# Patient Record
Sex: FEMALE | Race: WHITE | NOT HISPANIC OR LATINO | Employment: OTHER | URBAN - NONMETROPOLITAN AREA
[De-identification: names, ages, dates, MRNs, and addresses within clinical notes are randomized per-mention and may not be internally consistent; named-entity substitution may affect disease eponyms.]

---

## 2018-06-01 ENCOUNTER — OFFICE VISIT (OUTPATIENT)
Dept: ENDOCRINOLOGY | Facility: CLINIC | Age: 43
End: 2018-06-01

## 2018-06-01 ENCOUNTER — APPOINTMENT (OUTPATIENT)
Dept: LAB | Facility: HOSPITAL | Age: 43
End: 2018-06-01

## 2018-06-01 VITALS
OXYGEN SATURATION: 93 % | WEIGHT: 264.6 LBS | SYSTOLIC BLOOD PRESSURE: 138 MMHG | BODY MASS INDEX: 44.08 KG/M2 | HEIGHT: 65 IN | HEART RATE: 105 BPM | DIASTOLIC BLOOD PRESSURE: 82 MMHG

## 2018-06-01 DIAGNOSIS — E55.9 VITAMIN D DEFICIENCY: Primary | ICD-10-CM

## 2018-06-01 DIAGNOSIS — E21.1 SECONDARY HYPERPARATHYROIDISM, NON-RENAL (HCC): ICD-10-CM

## 2018-06-01 DIAGNOSIS — R94.6 ABNORMAL THYROID FUNCTION TEST: ICD-10-CM

## 2018-06-01 LAB
25(OH)D3 SERPL-MCNC: 22.1 NG/ML (ref 30–100)
CALCIUM SPEC-SCNC: 9.7 MG/DL (ref 8.4–10.2)
PTH-INTACT SERPL-MCNC: 56.5 PG/ML (ref 10–65)
T4 FREE SERPL-MCNC: 0.69 NG/DL (ref 0.78–2.19)
TSH SERPL DL<=0.05 MIU/L-ACNC: 1.19 MIU/ML (ref 0.46–4.68)

## 2018-06-01 PROCEDURE — 86376 MICROSOMAL ANTIBODY EACH: CPT | Performed by: INTERNAL MEDICINE

## 2018-06-01 PROCEDURE — 84439 ASSAY OF FREE THYROXINE: CPT | Performed by: INTERNAL MEDICINE

## 2018-06-01 PROCEDURE — 84443 ASSAY THYROID STIM HORMONE: CPT | Performed by: INTERNAL MEDICINE

## 2018-06-01 PROCEDURE — 82670 ASSAY OF TOTAL ESTRADIOL: CPT | Performed by: INTERNAL MEDICINE

## 2018-06-01 PROCEDURE — 99244 OFF/OP CNSLTJ NEW/EST MOD 40: CPT | Performed by: INTERNAL MEDICINE

## 2018-06-01 PROCEDURE — 82310 ASSAY OF CALCIUM: CPT | Performed by: INTERNAL MEDICINE

## 2018-06-01 PROCEDURE — 84146 ASSAY OF PROLACTIN: CPT | Performed by: INTERNAL MEDICINE

## 2018-06-01 PROCEDURE — 83970 ASSAY OF PARATHORMONE: CPT | Performed by: INTERNAL MEDICINE

## 2018-06-01 PROCEDURE — 82306 VITAMIN D 25 HYDROXY: CPT | Performed by: INTERNAL MEDICINE

## 2018-06-01 PROCEDURE — 36415 COLL VENOUS BLD VENIPUNCTURE: CPT | Performed by: INTERNAL MEDICINE

## 2018-06-01 RX ORDER — VENLAFAXINE 75 MG/1
75 TABLET ORAL DAILY
COMMUNITY

## 2018-06-01 RX ORDER — LORATADINE 10 MG/1
10 TABLET ORAL DAILY
COMMUNITY

## 2018-06-01 RX ORDER — HYDROCHLOROTHIAZIDE 25 MG/1
25 TABLET ORAL DAILY
Status: ON HOLD | COMMUNITY
End: 2019-10-15 | Stop reason: SDUPTHER

## 2018-06-01 RX ORDER — HYDROXYZINE 50 MG/1
50 TABLET, FILM COATED ORAL 3 TIMES DAILY PRN
COMMUNITY
End: 2019-05-30

## 2018-06-01 RX ORDER — ERGOCALCIFEROL 1.25 MG/1
50000 CAPSULE ORAL WEEKLY
COMMUNITY
End: 2019-01-29

## 2018-06-01 NOTE — PATIENT INSTRUCTIONS
Vitamin D3 is better than D2  You are taking 5 th u daily   Add 50 th u weekly     ---    Ideally take calcium citrate around 600 mg with or without meals on a daily basis

## 2018-06-01 NOTE — PROGRESS NOTES
Kristine Veloz is a 43 y.o. female patient that     comes for direct consultation request from Tere Mcgill MD for my opinion regarding     Chief Complaint   Patient presents with   • Vitamin D Deficiency           43 y o female comes for consultation     Reason , Vit D Deficiency and Secondary Hyperparathyroidism    Timing, Constant    Quality , Vit D is still low     Alleviating, Vit D 5 th u daily     Symptoms, Fatigue and Myalgias    Severity, mild    Quantity, PTH elevated on referral labs, nl on my assessment but low Vit D           Past Medical History:   Diagnosis Date   • Abnormal thyroid function test 6/1/2018   • Secondary hyperparathyroidism, non-renal 6/1/2018   • Vitamin D deficiency 6/1/2018     Family History   Problem Relation Age of Onset   • Thyroid disease Neg Hx      Social History   Substance Use Topics   • Smoking status: Not on file   • Smokeless tobacco: Not on file   • Alcohol use Not on file         Current Outpatient Prescriptions:   •  hydrochlorothiazide (HYDRODIURIL) 25 MG tablet, Take 25 mg by mouth Daily., Disp: , Rfl:   •  hydrOXYzine (ATARAX) 50 MG tablet, Take 50 mg by mouth 3 (Three) Times a Day As Needed for Itching., Disp: , Rfl:   •  loratadine (ALLERGY) 10 MG tablet, Take 10 mg by mouth Daily., Disp: , Rfl:   •  venlafaxine (EFFEXOR) 75 MG tablet, Take 75 mg by mouth 2 (Two) Times a Day., Disp: , Rfl:   •  vitamin D (ERGOCALCIFEROL) 20908 units capsule capsule, Take 50,000 Units by mouth 1 (One) Time Per Week., Disp: , Rfl:   •  Cholecalciferol 56007 units tablet, 50 thousand units po weekly, Disp: 12 tablet, Rfl: 11    Review of Systems    Review of Systems   Constitutional: Positive for fatigue. Negative for activity change, appetite change, chills, diaphoresis, fever and unexpected weight change.   HENT: Negative for congestion, dental problem, drooling, ear discharge, ear pain, facial swelling, mouth sores, postnasal drip, rhinorrhea, sinus pressure, sore  "throat, tinnitus, trouble swallowing and voice change.    Eyes: Negative for photophobia, pain, discharge, redness, itching and visual disturbance.   Respiratory: Negative for apnea, cough, choking, chest tightness, shortness of breath, wheezing and stridor.    Cardiovascular: Negative for chest pain, palpitations and leg swelling.   Gastrointestinal: Negative for abdominal distention, abdominal pain, constipation, diarrhea, nausea and vomiting.   Endocrine: Negative for cold intolerance, heat intolerance, polydipsia, polyphagia and polyuria.   Genitourinary: Negative for decreased urine volume, difficulty urinating, dysuria, flank pain, frequency, hematuria and urgency.   Musculoskeletal: Positive for myalgias. Negative for arthralgias, back pain, gait problem, joint swelling, neck pain and neck stiffness.   Skin: Negative for color change, pallor, rash and wound.   Allergic/Immunologic: Negative for immunocompromised state.   Neurological: Positive for weakness. Negative for dizziness, tremors, seizures, syncope, facial asymmetry, speech difficulty, light-headedness, numbness and headaches.   Hematological: Negative for adenopathy.   Psychiatric/Behavioral: Negative for agitation, behavioral problems, confusion, decreased concentration, dysphoric mood, hallucinations, self-injury, sleep disturbance and suicidal ideas. The patient is not nervous/anxious and is not hyperactive.         Objective:   /82 (BP Location: Left arm, Patient Position: Sitting, Cuff Size: Large Adult)   Pulse 105   Ht 163.8 cm (64.5\")   Wt 120 kg (264 lb 9.6 oz)   SpO2 93%   BMI 44.72 kg/m²     Physical Exam   Constitutional: She is oriented to person, place, and time. She appears well-developed.   HENT:   Head: Normocephalic.   Right Ear: External ear normal.   Left Ear: External ear normal.   Nose: Nose normal.   Eyes: Conjunctivae and EOM are normal. No scleral icterus.   Neck: Normal range of motion. Neck supple. No tracheal " deviation present. No thyromegaly present.   Cardiovascular: Normal rate, regular rhythm, normal heart sounds and intact distal pulses.  Exam reveals no gallop and no friction rub.    No murmur heard.  Pulmonary/Chest: Effort normal and breath sounds normal. No stridor. No respiratory distress. She has no wheezes. She has no rales. She exhibits no tenderness.   Abdominal: Soft. Bowel sounds are normal. She exhibits no distension and no mass. There is no tenderness. There is no rebound and no guarding.   Musculoskeletal: Normal range of motion. She exhibits no tenderness or deformity.   Lymphadenopathy:     She has no cervical adenopathy.   Neurological: She is alert and oriented to person, place, and time. She displays normal reflexes. She exhibits normal muscle tone. Coordination normal.   Skin: No rash noted. No erythema. No pallor.   Psychiatric: She has a normal mood and affect. Her behavior is normal. Judgment and thought content normal.       Lab Review    Results for orders placed or performed in visit on 06/01/18   PTH, Intact & Calcium   Result Value Ref Range    PTH, Intact 56.5 10.0 - 65.0 pg/mL    Calcium 9.7 8.4 - 10.2 mg/dL   Vitamin D 25 Hydroxy   Result Value Ref Range    25 Hydroxy, Vitamin D 22.1 (L) 30.0 - 100.0 ng/ml   TSH   Result Value Ref Range    TSH 1.190 0.460 - 4.680 mIU/mL   Thyroid Peroxidase Antibody   Result Value Ref Range    Thyroid Peroxidase Antibody 14 0 - 34 IU/mL   T4, Free   Result Value Ref Range    Free T4 0.69 (L) 0.78 - 2.19 ng/dL           Assessment/Plan       ICD-10-CM ICD-9-CM   1. Vitamin D deficiency E55.9 268.9   2. Secondary hyperparathyroidism, non-renal E21.1 252.02   3. Abnormal thyroid function test R94.6 794.5         I reviewed and summarized records from Tere Mcgill MD from 2018 and I reviewed / ordered labs.   From review of records :    Vitamin D3 is better than D2  You are taking 5 th u daily   Add 50 th u weekly     ---    Ideally take calcium  citrate around 600 mg with or without meals on a daily basis     --    Low Free T4 , most likely not significant but for completeness obtain   Free T4 by dialysis, prolactin, FSH, LH, estradiol         Please see my above opinion and suggestions.     I will see patient as needed    A copy of my note was sent to Tere Mcgill MD

## 2018-06-03 LAB — THYROPEROXIDASE AB SERPL-ACNC: 14 IU/ML (ref 0–34)

## 2018-06-05 LAB
ESTRADIOL SERPL HS-MCNC: 59.7 PG/ML
PROLACTIN SERPL-MCNC: 17.3 NG/ML (ref 4.8–23.3)

## 2018-06-17 DIAGNOSIS — R94.6 ABNORMAL THYROID FUNCTION TEST: Primary | ICD-10-CM

## 2018-09-12 ENCOUNTER — OFFICE VISIT (OUTPATIENT)
Dept: ENDOCRINOLOGY | Facility: CLINIC | Age: 43
End: 2018-09-12

## 2018-09-12 ENCOUNTER — APPOINTMENT (OUTPATIENT)
Dept: LAB | Facility: HOSPITAL | Age: 43
End: 2018-09-12

## 2018-09-12 VITALS
HEART RATE: 92 BPM | SYSTOLIC BLOOD PRESSURE: 124 MMHG | WEIGHT: 268 LBS | DIASTOLIC BLOOD PRESSURE: 70 MMHG | BODY MASS INDEX: 45.75 KG/M2 | HEIGHT: 64 IN

## 2018-09-12 DIAGNOSIS — R94.6 ABNORMAL THYROID FUNCTION TEST: Primary | ICD-10-CM

## 2018-09-12 DIAGNOSIS — R06.83 SNORING: ICD-10-CM

## 2018-09-12 DIAGNOSIS — E55.9 VITAMIN D DEFICIENCY: ICD-10-CM

## 2018-09-12 LAB
T4 FREE SERPL-MCNC: 0.87 NG/DL (ref 0.78–2.19)
TSH SERPL DL<=0.05 MIU/L-ACNC: 1.26 MIU/ML (ref 0.46–4.68)

## 2018-09-12 PROCEDURE — 99214 OFFICE O/P EST MOD 30 MIN: CPT | Performed by: NURSE PRACTITIONER

## 2018-09-12 PROCEDURE — 84439 ASSAY OF FREE THYROXINE: CPT | Performed by: INTERNAL MEDICINE

## 2018-09-12 PROCEDURE — 84443 ASSAY THYROID STIM HORMONE: CPT | Performed by: INTERNAL MEDICINE

## 2018-09-12 PROCEDURE — 36415 COLL VENOUS BLD VENIPUNCTURE: CPT | Performed by: INTERNAL MEDICINE

## 2018-09-12 NOTE — PROGRESS NOTES
Subjective    Kristine Veloz is a 43 y.o. female. she is here today for follow-up.    History of Present Illness           43 y o female comes for consultation      Reason , Vit D Deficiency and Secondary Hyperparathyroidism     Timing, Constant     Quality , Vit D is still low      Alleviating, Vit D 5 th u daily      Symptoms, Fatigue and Myalgias     Severity, mild     Quantity, PTH elevated on referral labs, nl on my assessment but low Vit D          The following portions of the patient's history were reviewed and updated as appropriate:   Past Medical History:   Diagnosis Date   • Abnormal thyroid function test 6/1/2018   • Secondary hyperparathyroidism, non-renal (CMS/HCC) 6/1/2018   • Vitamin D deficiency 6/1/2018     No past surgical history on file.  Family History   Problem Relation Age of Onset   • Thyroid disease Neg Hx      OB History     No data available        Current Outpatient Prescriptions   Medication Sig Dispense Refill   • Cholecalciferol 97687 units tablet 50 thousand units po weekly 12 tablet 11   • hydrochlorothiazide (HYDRODIURIL) 25 MG tablet Take 25 mg by mouth Daily.     • hydrOXYzine (ATARAX) 50 MG tablet Take 50 mg by mouth 3 (Three) Times a Day As Needed for Itching.     • loratadine (ALLERGY) 10 MG tablet Take 10 mg by mouth Daily.     • venlafaxine (EFFEXOR) 75 MG tablet Take 75 mg by mouth 2 (Two) Times a Day.     • vitamin D (ERGOCALCIFEROL) 68845 units capsule capsule Take 50,000 Units by mouth 1 (One) Time Per Week.       No current facility-administered medications for this visit.      Allergies   Allergen Reactions   • Penicillins Shortness Of Breath     Other reaction(s): Hives     Social History     Social History   • Marital status:      Social History Main Topics   • Smoking status: Never Smoker   • Smokeless tobacco: Never Used   • Alcohol use No   • Drug use: Unknown     Other Topics Concern   • Not on file       Review of Systems  Review of Systems  "  Constitutional: Negative for activity change, appetite change, diaphoresis and fatigue.   HENT: Negative for facial swelling, sneezing, sore throat, tinnitus, trouble swallowing and voice change.    Eyes: Negative for photophobia, pain, discharge, redness, itching and visual disturbance.   Respiratory: Negative for apnea, cough, choking, chest tightness and shortness of breath.    Cardiovascular: Negative for chest pain, palpitations and leg swelling.   Gastrointestinal: Negative for abdominal distention, abdominal pain, constipation, diarrhea, nausea and vomiting.   Endocrine: Negative for cold intolerance, heat intolerance, polydipsia, polyphagia and polyuria.   Genitourinary: Negative for difficulty urinating, dysuria, frequency, hematuria and urgency.   Musculoskeletal: Negative for arthralgias, back pain, gait problem, joint swelling, myalgias, neck pain and neck stiffness.   Skin: Negative for color change, pallor, rash and wound.   Neurological: Negative for dizziness, tremors, weakness, light-headedness, numbness and headaches.   Hematological: Negative for adenopathy. Does not bruise/bleed easily.   Psychiatric/Behavioral: Negative for behavioral problems, confusion and sleep disturbance.        Objective    /70 (BP Location: Right arm, Patient Position: Sitting, Cuff Size: Adult)   Pulse 92   Ht 162.6 cm (64\")   Wt 122 kg (268 lb)   BMI 46.00 kg/m²   Physical Exam   Constitutional: She is oriented to person, place, and time. She appears well-developed and well-nourished. No distress.   HENT:   Head: Normocephalic and atraumatic.   Right Ear: External ear normal.   Left Ear: External ear normal.   Nose: Nose normal.   Eyes: Pupils are equal, round, and reactive to light. Conjunctivae and EOM are normal.   Neck: Normal range of motion. Neck supple. No tracheal deviation present. No thyromegaly present.   Cardiovascular: Normal rate, regular rhythm and normal heart sounds.    No murmur " heard.  Pulmonary/Chest: Effort normal and breath sounds normal. No respiratory distress. She has no wheezes.   Abdominal: Soft. Bowel sounds are normal. There is no tenderness. There is no rebound and no guarding.   Musculoskeletal: Normal range of motion. She exhibits no edema, tenderness or deformity.   Neurological: She is alert and oriented to person, place, and time. No cranial nerve deficit.   Skin: Skin is warm and dry. No rash noted.   Psychiatric: She has a normal mood and affect. Her behavior is normal. Judgment and thought content normal.       Lab Review  No results found for: GLUCOSE, NA, K, CL, CO2, BUN, CREATININE, HGBA1C, TRIG, LDL    Assessment/Plan      1. Abnormal thyroid function test    2. Vitamin D deficiency    3. Snoring    .    Medications prescribed:  Outpatient Encounter Prescriptions as of 9/12/2018   Medication Sig Dispense Refill   • Cholecalciferol 61708 units tablet 50 thousand units po weekly 12 tablet 11   • hydrochlorothiazide (HYDRODIURIL) 25 MG tablet Take 25 mg by mouth Daily.     • hydrOXYzine (ATARAX) 50 MG tablet Take 50 mg by mouth 3 (Three) Times a Day As Needed for Itching.     • loratadine (ALLERGY) 10 MG tablet Take 10 mg by mouth Daily.     • venlafaxine (EFFEXOR) 75 MG tablet Take 75 mg by mouth 2 (Two) Times a Day.     • vitamin D (ERGOCALCIFEROL) 00257 units capsule capsule Take 50,000 Units by mouth 1 (One) Time Per Week.       No facility-administered encounter medications on file as of 9/12/2018.        Orders placed during this encounter include:  No orders of the defined types were placed in this encounter.    Vitamin D3 is better than D2  You are taking 5 th u daily   Add 50 th u weekly     Component      Latest Ref Rng & Units 6/1/2018   25 Hydroxy, Vitamin D      30.0 - 100.0 ng/ml 22.1 (L)          ---     Ideally take calcium citrate around 600 mg with or without meals on a daily basis      --          Component      Latest Ref Rng & Units 6/1/2018   TSH  Baseline      0.460 - 4.680 mIU/mL 1.190   Thyroid Peroxidase Antibody      0 - 34 IU/mL 14   Free T4      0.78 - 2.19 ng/dL 0.69 (L)        Low Free T4 , most likely not significant but for completeness obtain   Free T4 by dialysis, prolactin, FSH, LH, estradiol         Component      Latest Ref Rng & Units 6/1/2018   Prolactin      4.8 - 23.3 ng/mL 17.3   Estradiol      pg/mL 59.7      labs pending today will call   ------------------------------    Snoring     Refer to     4. Follow-up: Return for Recheck.

## 2018-09-17 LAB — T4 FREE SERPL DIALY-MCNC: 0.8 NG/DL

## 2018-09-27 ENCOUNTER — OFFICE VISIT (OUTPATIENT)
Dept: SLEEP MEDICINE | Facility: HOSPITAL | Age: 43
End: 2018-09-27

## 2018-09-27 VITALS
WEIGHT: 270 LBS | HEIGHT: 64 IN | HEART RATE: 73 BPM | SYSTOLIC BLOOD PRESSURE: 143 MMHG | BODY MASS INDEX: 46.1 KG/M2 | DIASTOLIC BLOOD PRESSURE: 95 MMHG | OXYGEN SATURATION: 99 %

## 2018-09-27 DIAGNOSIS — G47.33 OBSTRUCTIVE SLEEP APNEA, ADULT: Primary | ICD-10-CM

## 2018-09-27 PROCEDURE — 99204 OFFICE O/P NEW MOD 45 MIN: CPT | Performed by: INTERNAL MEDICINE

## 2018-09-27 NOTE — PROGRESS NOTES
New Patient Sleep Medicine Consultation    Encounter Date: 9/27/2018         Patient's PCP: Tere Mcgill MD  Referring provider: No ref. provider found  Reason for consultation chief complaint: snoring, witnessed apneas, excessive daytime sleepiness and unrefreshing sleep    Kristine Veloz is a 43 y.o. female who admits to snoring, unrestful sleep, High blod pressure, gasping during sleep, excessive daytime sleepiness, sleeping less than 6 hours per night, Disturbed or restless sleep, choking duing sleep, Up to the bathroom at night, sleepy driving, night sweats, teeth grinding and difficulty staying asleep. She denies cataplexy, sleep paralysis, or hypnagogic hallucinations. Her bedtime is ~ 1027-4106. She  falls asleep after 10-15 minutes, and is up 3-4 times per night. She wakes up ~ 0600. She endorses 4-5 hours of sleep. She drinks 0 cups of coffee, 0 teas, and 0 sodas per day. She drinks 0 alcoholic beverages per week. She is not a current smoker. She does not take sedatives or hypnotics. She has some sleepiness with driving. She naps when unstimulated.     [place] - 12    Past Medical History:   Diagnosis Date   • Abnormal thyroid function test 6/1/2018   • Secondary hyperparathyroidism, non-renal (CMS/HCC) 6/1/2018   • Vitamin D deficiency 6/1/2018     Social History     Social History   • Marital status:      Spouse name: N/A   • Number of children: N/A   • Years of education: N/A     Occupational History   • Not on file.     Social History Main Topics   • Smoking status: Never Smoker   • Smokeless tobacco: Never Used   • Alcohol use No   • Drug use: Unknown   • Sexual activity: Not on file     Other Topics Concern   • Not on file     Social History Narrative   • No narrative on file     Family History   Problem Relation Age of Onset   • Thyroid disease Neg Hx    , 4 kids - 4 kids  0 brothers and 1 sisters  Other family history + for: HTN  Smoking history: smoked never  FH of sleep  "disorders: none    Review of Systems:  Constitutional: negative  Eyes: negative  Ears, nose, mouth, throat, and face: negative  Respiratory: negative  Cardiovascular: negative  Gastrointestinal: negative  Genitourinary:negative  Integument/breast: negative  Hematologic/lymphatic: negative  Musculoskeletal:negative  Neurological: negative  Behavioral/Psych: negative  Endocrine: negative  Allergic/Immunologic: negative Patient advised to discuss any positive ROS with PCP.      Vitals:    09/27/18 0914   BP: 143/95   Pulse: 73   SpO2: 99%     Body mass index is 46.35 kg/m². Patient's Body mass index is 46.35 kg/m². BMI is above normal parameters. Recommendations include: referral to primary care.  Neck circumference: 19\"          General: Alert. Cooperative. Well developed. No acute distress.             Head:  Normocephalic. Symmetrical. Atraumatic.              Eyes: Sclera clear. No icterus. PERRLA. Normal EOM.             Ears: No deformities. Normal hearing.             Nose: No septal deviation. No drainage.          Throat: No oral lesions. No thrush. Moist mucous membranes. Trachea midline    Tongue is enlarged    Dentition is fair       Pharynx: Posterior pharyngeal pillars are narrow    Mallampati score of IV (only hard palate visible)    Pharynx is normal with unrermarkable tonsils   Chest Wall:  Normal shape. Symmetric expansion with respiration. No tenderness.          Lungs:  Clear to auscultation bilaterally. No wheezes. No rhonchi. No rales. Respirations regular, even and unlabored.            Heart:  Regular rhythm and normal rate. Normal S1 and S2. No murmur.     Abdomen:  Soft, non-tender and non-distended. Normal bowel sounds. No masses.  Extremities:  Moves all extremities well. No edema.           Pulses: Pulses palpable and equal bilaterally.               Skin: Dry. Intact. No bleeding. No rash.           Neuro: Moves all 4 extremities and cranial nerves grossly intact.  Psychiatric: Normal mood " and affect.      Current Outpatient Prescriptions:   •  Cholecalciferol 17960 units tablet, 50 thousand units po weekly, Disp: 12 tablet, Rfl: 11  •  hydrochlorothiazide (HYDRODIURIL) 25 MG tablet, Take 25 mg by mouth Daily., Disp: , Rfl:   •  hydrOXYzine (ATARAX) 50 MG tablet, Take 50 mg by mouth 3 (Three) Times a Day As Needed for Itching., Disp: , Rfl:   •  loratadine (ALLERGY) 10 MG tablet, Take 10 mg by mouth Daily., Disp: , Rfl:   •  venlafaxine (EFFEXOR) 75 MG tablet, Take 75 mg by mouth 2 (Two) Times a Day., Disp: , Rfl:   •  vitamin D (ERGOCALCIFEROL) 41124 units capsule capsule, Take 50,000 Units by mouth 1 (One) Time Per Week., Disp: , Rfl:     Contraindications to home sleep test: none    ASSESSMENT:  1. Obstructive sleep apnea   1. Check home sleep study  2. Obesity  3. Generalized anxiety disorder -   1. On Effexor and stable      RTC in 3 months         This document has been electronically signed by Jermaine Oliva MD on September 27, 2018         CC: Tere Mcgill MD          No ref. provider found

## 2018-10-17 ENCOUNTER — TELEPHONE (OUTPATIENT)
Dept: FAMILY MEDICINE CLINIC | Facility: CLINIC | Age: 43
End: 2018-10-17

## 2018-10-17 ENCOUNTER — TELEPHONE (OUTPATIENT)
Dept: ENDOCRINOLOGY | Facility: CLINIC | Age: 43
End: 2018-10-17

## 2018-10-17 NOTE — TELEPHONE ENCOUNTER
Thyroid levels were normal          Documentation       You   [place], GRACIELA 19 minutes ago (9:48 AM)         She called to get lab results-said she went to lab few weeks and can be reached at [phone]

## 2018-10-23 ENCOUNTER — HOSPITAL ENCOUNTER (OUTPATIENT)
Dept: SLEEP MEDICINE | Facility: HOSPITAL | Age: 43
Discharge: HOME | End: 2018-10-23
Attending: INTERNAL MEDICINE | Admitting: INTERNAL MEDICINE

## 2018-10-23 DIAGNOSIS — G47.33 OBSTRUCTIVE SLEEP APNEA, ADULT: ICD-10-CM

## 2018-10-23 PROCEDURE — 95806 SLEEP STUDY UNATT&RESP EFFT: CPT

## 2018-10-23 PROCEDURE — 95806 SLEEP STUDY UNATT&RESP EFFT: CPT | Performed by: INTERNAL MEDICINE

## 2018-11-05 DIAGNOSIS — G47.33 OSA (OBSTRUCTIVE SLEEP APNEA): Primary | ICD-10-CM

## 2019-01-29 ENCOUNTER — OFFICE VISIT (OUTPATIENT)
Dept: SLEEP MEDICINE | Facility: HOSPITAL | Age: 44
End: 2019-01-29

## 2019-01-29 VITALS
SYSTOLIC BLOOD PRESSURE: 190 MMHG | HEART RATE: 85 BPM | DIASTOLIC BLOOD PRESSURE: 85 MMHG | BODY MASS INDEX: 47.56 KG/M2 | WEIGHT: 278.6 LBS | OXYGEN SATURATION: 98 % | HEIGHT: 64 IN

## 2019-01-29 DIAGNOSIS — G47.33 OBSTRUCTIVE SLEEP APNEA, ADULT: Primary | ICD-10-CM

## 2019-01-29 PROCEDURE — 99213 OFFICE O/P EST LOW 20 MIN: CPT | Performed by: INTERNAL MEDICINE

## 2019-01-29 NOTE — PROGRESS NOTES
CHIEF COMPLAINT: follow up home sleep study    HPI:    The patient is a 43 y.o. female.  She returns to sleep clinic to discuss the results of the recent home sleep study performed on 10/23/2018.  The AHI/JOHN was 10.5.    INTERVAL MEDICAL HISTORY: started on autocpap 5-20 cm H2O. her sx of ROGER are improved with less excessive daytime sleepiness, reduced naps, and better sleep at night. She reports compliance.      PAP Data:    Mask type: FFM    Bed time: 2130  Sleep latency: 2 minutes  Number of times awakens during the night: 0-1  Wake time: 0630  Estimated total sleep time at night: 6-8 hours  Caffeine intake: 0oz of coffee, 0oz of tea, and 0oz of soda  Alcohol intake: 0 drinks per week  Nap time: none  Sleepiness with Driving: none      MEDICATIONS:   Current Outpatient Medications:   •  Cholecalciferol 27101 units tablet, 50 thousand units po weekly, Disp: 12 tablet, Rfl: 11  •  hydrochlorothiazide (HYDRODIURIL) 25 MG tablet, Take 25 mg by mouth Daily., Disp: , Rfl:   •  loratadine (ALLERGY) 10 MG tablet, Take 10 mg by mouth Daily., Disp: , Rfl:   •  venlafaxine (EFFEXOR) 75 MG tablet, Take 75 mg by mouth 2 (Two) Times a Day., Disp: , Rfl:   •  hydrOXYzine (ATARAX) 50 MG tablet, Take 50 mg by mouth 3 (Three) Times a Day As Needed for Itching., Disp: , Rfl:     PHYSICAL EXAM  Vital Signs (last 24 hours)        0700  -   0659  07  -   1022   Most Recent    Heart Rate     85     85    BP     (!) 190/85     (!) 190/85    SpO2 (%)     98     98      BP machine not working correctly. Normally -140, patient without symptoms. Follow up with PCP  Gen:  No acute distress, alert, oriented  Lungs:  CTA with normal effort   CV:  RRR, no M/R/G  GI:  soft, non-tender  Ext:  no c/c/e    Sleep Testin. home sleep study on 10/23/2018, AHI of 10.5   2. Currently on 5-20 cm H2O    ASSESSMENT / PLAN:     1. Obstructive sleep apnea Established, stable (1)  1. Continue PAP as prescribed.   2. Script  for PAP supplies  3. Compliance report  4. Return to clinic in 1 year with compliance check unless sx change in the interim period.      All of the patient's questions were answered. She states understanding and agreement with my assessment and plan as above.  Total time 15 min, more than half spent in face to face counseling and coordination of care.    RTC in 12 months     She agrees to return sooner on a prn basis.             This document has been electronically signed by Jermaine Oliva MD on January 29, 2019           CC: Tere Mcgill MD          No ref. provider found      PAP Data:    Time frame: 11/16/2018 -01/28/2019   Compliance 95.9 %  Average use on days used: 5hrs 32 min  Percent of days with usage greater than or equal to 4 hours: 85.1%  PAP range : 5-20 cm H2O  Average 90% pressure: 10.2 cmH2O  Leak: 2.25 minutes  Average AHI 1.7 events/hr

## 2019-04-15 ENCOUNTER — TELEPHONE (OUTPATIENT)
Dept: BARIATRICS/WEIGHT MGMT | Facility: CLINIC | Age: 44
End: 2019-04-15

## 2019-04-15 NOTE — TELEPHONE ENCOUNTER
Patient received her new patient paperwork. I told her to be sure to bring it to her appointment filled out or we will have to reschedule. Patient voiced understanding.

## 2019-04-30 ENCOUNTER — OFFICE VISIT (OUTPATIENT)
Dept: BARIATRICS/WEIGHT MGMT | Facility: CLINIC | Age: 44
End: 2019-04-30

## 2019-04-30 VITALS
OXYGEN SATURATION: 98 % | SYSTOLIC BLOOD PRESSURE: 138 MMHG | HEIGHT: 65 IN | TEMPERATURE: 100.4 F | WEIGHT: 271 LBS | BODY MASS INDEX: 45.15 KG/M2 | DIASTOLIC BLOOD PRESSURE: 87 MMHG | HEART RATE: 101 BPM

## 2019-04-30 DIAGNOSIS — E66.01 CLASS 3 SEVERE OBESITY DUE TO EXCESS CALORIES WITH SERIOUS COMORBIDITY AND BODY MASS INDEX (BMI) OF 45.0 TO 49.9 IN ADULT (HCC): Primary | ICD-10-CM

## 2019-04-30 DIAGNOSIS — G47.33 OBSTRUCTIVE SLEEP APNEA SYNDROME: ICD-10-CM

## 2019-04-30 DIAGNOSIS — E08.00 DIABETES MELLITUS DUE TO UNDERLYING CONDITION WITH HYPEROSMOLARITY WITHOUT COMA, WITHOUT LONG-TERM CURRENT USE OF INSULIN (HCC): ICD-10-CM

## 2019-04-30 DIAGNOSIS — I10 ESSENTIAL HYPERTENSION: ICD-10-CM

## 2019-04-30 PROBLEM — G47.30 SLEEP APNEA: Status: ACTIVE | Noted: 2019-04-30

## 2019-04-30 PROBLEM — E11.9 DIABETES MELLITUS (HCC): Status: ACTIVE | Noted: 2019-04-30

## 2019-04-30 PROBLEM — E66.813 CLASS 3 SEVERE OBESITY DUE TO EXCESS CALORIES WITH SERIOUS COMORBIDITY AND BODY MASS INDEX (BMI) OF 45.0 TO 49.9 IN ADULT: Status: ACTIVE | Noted: 2019-04-30

## 2019-04-30 PROCEDURE — 99204 OFFICE O/P NEW MOD 45 MIN: CPT | Performed by: SURGERY

## 2019-04-30 RX ORDER — ATORVASTATIN CALCIUM 10 MG/1
10 TABLET, FILM COATED ORAL DAILY
COMMUNITY
Start: 2019-02-26 | End: 2019-05-28

## 2019-04-30 RX ORDER — METFORMIN HYDROCHLORIDE 500 MG/1
500 TABLET, EXTENDED RELEASE ORAL DAILY
COMMUNITY
Start: 2019-02-26 | End: 2019-05-28

## 2019-04-30 NOTE — PROGRESS NOTES
Patient Care Team:  Tere Mcgill MD as PCP - General (Internal Medicine)  Emiliana Witt MA as Medical Assistant    Reason for Visit:  Surgical Weight loss    Subjective     Patient is a 43 y.o. female presents with morbid obesity and her Body mass index is 45.1 kg/m².     She is here for discussion of surgical weight loss options.  She stated she has been with the disease of obesity for year(s).  She stated she suffers from sleep apnea, diabetes, hypertension and morbid obesity due to her weight gain.  She stated that weight loss helps alleviate these symptoms.   She stated that she has tried the low-fat diet, high-protein diets and medications such as phentermine to help with weight loss.  She stated that she has attempted these conservative methods for weight loss without maintaining long term success.  Today she would like to discuss surgical weight loss options such as the Laparoscopic Sleeve Gastrectomy or the Laparoscopic R - Y Gastric Bypass.     Review of Systems  Negative except the below listed  General ROS: positive for  - fatigue, night sweats and weight gain  ENT ROS: positive for - nasal congestion  Respiratory ROS: positive for -snoring  Cardiovascular ROS: positive for - edema  Musculoskeletal ROS: positive for - joint pain    History  Past Medical History:   Diagnosis Date   • Abnormal thyroid function test 6/1/2018   • Anxiety    • Back pain    • Depression    • Diabetes mellitus (CMS/HCC)    • Hypertension    • Secondary hyperparathyroidism, non-renal (CMS/HCC) 6/1/2018   • Sleep apnea     CPAP    • Vitamin D deficiency 6/1/2018     Past Surgical History:   Procedure Laterality Date   • CHOLECYSTECTOMY  08/2008    Lap      Family History   Problem Relation Age of Onset   • Arthritis Mother    • Heart disease Mother    • Heart disease Father    • Arthritis Maternal Grandmother    • Diabetes Maternal Grandmother    • Heart disease Maternal Grandmother    • Arthritis Maternal  Grandfather    • Arthritis Paternal Grandfather    • Thyroid disease Neg Hx      Social History     Tobacco Use   • Smoking status: Never Smoker   • Smokeless tobacco: Never Used   Substance Use Topics   • Alcohol use: No   • Drug use: No       (Not in a hospital admission)  Allergies:  Penicillins      Current Outpatient Medications:   •  atorvastatin (LIPITOR) 10 MG tablet, Take 10 mg by mouth Daily., Disp: , Rfl:   •  Cholecalciferol 71747 units tablet, 50 thousand units po weekly, Disp: 12 tablet, Rfl: 11  •  hydrochlorothiazide (HYDRODIURIL) 25 MG tablet, Take 25 mg by mouth Daily., Disp: , Rfl:   •  loratadine (ALLERGY) 10 MG tablet, Take 10 mg by mouth Daily., Disp: , Rfl:   •  metFORMIN ER (GLUCOPHAGE-XR) 500 MG 24 hr tablet, Take 500 mg by mouth Daily., Disp: , Rfl:   •  venlafaxine (EFFEXOR) 75 MG tablet, Take 75 mg by mouth 2 (Two) Times a Day., Disp: , Rfl:   •  hydrOXYzine (ATARAX) 50 MG tablet, Take 50 mg by mouth 3 (Three) Times a Day As Needed for Itching., Disp: , Rfl:     Objective     Vital Signs  Temp:  [100.4 °F (38 °C)] 100.4 °F (38 °C)  Heart Rate:  [101] 101  BP: (138)/(87) 138/87  Body mass index is 45.1 kg/m².      04/30/19  0943   Weight: 123 kg (271 lb)       Physical Exam:      HEENT: extra ocular movement intact and Thyroid without masses  Respiratory: appears well, vitals normal, no respiratory distress, acyanotic, normal RR, neck free of mass or lymphadenopathy, chest clear, no wheezing, crepitations, rhonchi, normal symmetric air entry  Cardiovascular: Regular rate and rhythm, S1, S2 normal, no murmur, click, rub or gallop  GI: Soft, non-tender, normal bowel sounds; no bruits, organomegaly or masses.  Abnormal shape: obese  Musculoskeletal: inspection - no abnormality  Neurologic: alert, oriented, normal speech, no focal findings or movement disorder noted       Results Review:   None        Assessment/Plan   Encounter Diagnoses   Name Primary?   • Class 3 severe obesity due to  excess calories with serious comorbidity and body mass index (BMI) of 45.0 to 49.9 in adult (CMS/[facility]) Yes   • Diabetes mellitus due to underlying condition with hyperosmolarity without coma, without long-term current use of insulin (CMS/[facility])    • Obstructive sleep apnea syndrome    • Essential hypertension        She has been provided a structured dietary regimen based off of her behavior.  I discussed with the patient the etiology of the disease of obesity and the potential comorbid conditions associated with this disease.  She was instructed to follow the dietary regimen and follow-up with our program in 1 month's time with any additional questions as they may arise during this time.  We emphasized on focusing on proteins and meals high in fiber as well as adequate hydration that exceed 64 ounces of water daily.  The comorbid conditions associated with her obesity which include hypertension, obstructive sleep apnea and diabetes have remained stable on current treatment.  I discussed the patient's findings and my recommendations with patient.       Dr. Luc Hendrickson MD [facility]    04/30/19  11:39 AM  Patient Care Team:  Tere Mcgill MD as PCP - General (Internal Medicine)  Emiliana Witt MA as Medical Assistant

## 2019-05-30 ENCOUNTER — OFFICE VISIT (OUTPATIENT)
Dept: BARIATRICS/WEIGHT MGMT | Facility: CLINIC | Age: 44
End: 2019-05-30

## 2019-05-30 VITALS
DIASTOLIC BLOOD PRESSURE: 84 MMHG | TEMPERATURE: 99.3 F | HEART RATE: 71 BPM | HEIGHT: 65 IN | BODY MASS INDEX: 45.45 KG/M2 | SYSTOLIC BLOOD PRESSURE: 141 MMHG | OXYGEN SATURATION: 100 % | WEIGHT: 272.8 LBS

## 2019-05-30 DIAGNOSIS — G47.33 OBSTRUCTIVE SLEEP APNEA SYNDROME: ICD-10-CM

## 2019-05-30 DIAGNOSIS — E66.01 CLASS 3 SEVERE OBESITY DUE TO EXCESS CALORIES WITH SERIOUS COMORBIDITY AND BODY MASS INDEX (BMI) OF 45.0 TO 49.9 IN ADULT (HCC): Primary | ICD-10-CM

## 2019-05-30 DIAGNOSIS — E66.9 DIABETES MELLITUS TYPE 2 IN OBESE (HCC): ICD-10-CM

## 2019-05-30 DIAGNOSIS — E11.69 DIABETES MELLITUS TYPE 2 IN OBESE (HCC): ICD-10-CM

## 2019-05-30 DIAGNOSIS — I10 ESSENTIAL HYPERTENSION: ICD-10-CM

## 2019-05-30 PROCEDURE — 99214 OFFICE O/P EST MOD 30 MIN: CPT | Performed by: NURSE PRACTITIONER

## 2019-05-30 RX ORDER — ATORVASTATIN CALCIUM 10 MG/1
10 TABLET, FILM COATED ORAL DAILY
COMMUNITY
End: 2019-10-15 | Stop reason: HOSPADM

## 2019-05-30 NOTE — PROGRESS NOTES
Patient Care Team:  Tere Mcgill MD as PCP - General (Internal Medicine)  Emiliana Murphy MA as Medical Assistant    Reason for Visit:  Medical Weight Loss    Subjective        Kristine Veloz is a 44 y.o. year old female who is here for follow-up and continued medical management of morbid obesity. Her current Body mass index is 45.4 kg/m². She states she suffers from hypertension, sleep apnea, diabetes, and morbid obesity due to weight gain. She is currently following the 4 meals/day diet prescription. Kristine Veloz previously agreed to incorporate the prescription as provided, while also increasing physical exercise. She admits to struggling to follow the prescription as provided. Patient states, however, that she has been successful at avoiding sodas and sweet teas and has been walking 3-4 times per week for 30-40 minutes for exercise. Kristine Veloz also states she has been drinking 60+ ounces of water and unsure  grams of protein intake per day. As a result, she has gained 1 lbs of weight since her last visit.     Review of Systems  Negative except the below listed  Psychological ROS: positive for - anxiety and depression  Endocrine ROS: positive for - sofy blood sugar  Musculoskeletal ROS: positive for - joint pain and pain in back     History  Past Medical History:   Diagnosis Date   • Abnormal thyroid function test 6/1/2018   • Anxiety    • Back pain    • Depression    • Diabetes mellitus (CMS/HCC)    • Hypertension    • Secondary hyperparathyroidism, non-renal (CMS/HCC) 6/1/2018   • Sleep apnea     CPAP    • Vitamin D deficiency 6/1/2018     Past Surgical History:   Procedure Laterality Date   • CHOLECYSTECTOMY  08/2008    Lap      Family History   Problem Relation Age of Onset   • Arthritis Mother    • Heart disease Mother    • Heart disease Father    • Arthritis Maternal Grandmother    • Diabetes Maternal Grandmother    • Heart disease Maternal Grandmother    • Arthritis  Maternal Grandfather    • Arthritis Paternal Grandfather    • Thyroid disease Neg Hx      Social History     Tobacco Use   • Smoking status: Never Smoker   • Smokeless tobacco: Never Used   Substance Use Topics   • Alcohol use: No   • Drug use: No       (Not in a hospital admission)  Allergies:  Penicillins      Current Outpatient Medications:   •  Atorvastatin Calcium (LIPITOR PO), Take 10 mg by mouth Daily., Disp: , Rfl:   •  Cholecalciferol 78986 units tablet, 50 thousand units po weekly, Disp: 12 tablet, Rfl: 11  •  hydrochlorothiazide (HYDRODIURIL) 25 MG tablet, Take 25 mg by mouth Daily., Disp: , Rfl:   •  loratadine (ALLERGY) 10 MG tablet, Take 10 mg by mouth Daily., Disp: , Rfl:   •  metFORMIN (GLUCOPHAGE) 500 MG tablet, Take 500 mg by mouth 2 (Two) Times a Day With Meals., Disp: , Rfl:   •  venlafaxine (EFFEXOR) 75 MG tablet, Take 75 mg by mouth 2 (Two) Times a Day., Disp: , Rfl:     Objective     Vital Signs  Temp:  [99.3 °F (37.4 °C)] 99.3 °F (37.4 °C)  Heart Rate:  [71] 71  BP: (141)/(84) 141/84  Body mass index is 45.4 kg/m².      05/30/19  1043   Weight: 124 kg (272 lb 12.8 oz)       Physical Exam:  HEENT: extra ocular movement intact  Respiratory: appears well, vitals normal, no respiratory distress, acyanotic, normal RR, chest clear, no wheezing, crepitations, rhonchi, normal symmetric air entry  Cardiovascular: Regular rate and rhythm, S1, S2 normal, no murmur, click, rub or gallop  GI: Soft, non-tender, normal bowel sounds; no bruits, organomegaly or masses.  Abnormal shape: obese  Musculoskeletal: inspection - no abnormality, range of motion normal  Neurologic: alert, oriented, normal speech, no focal findings or movement disorder noted       Results Review:   None        Assessment/Plan   Encounter Diagnoses   Name Primary?   • Class 3 severe obesity due to excess calories with serious comorbidity and body mass index (BMI) of 45.0 to 49.9 in adult (CMS/[facility]) Yes   • Obstructive sleep apnea  "syndrome    • Essential hypertension    • Diabetes mellitus type 2 in obese (CMS/[facility])          1. Kristine Veloz was seen today for follow-up, obesity, nutrition counseling and weight loss. She has gained 1 lb of weight since her last visit, making her BMI 45.4. Today we discussed consistency with healthy changes in lifestyle, diet, and exercise for long term success. Kristine Veloz had received handouts to her explaining the recommendation on portion sizes/appetite control/reading nutrition labels. Intensive behavioral therapy for obesity was done today as well. Patient received \"Perfect Protein\" education packet today to assist with measuring daily grams of protein intake.    Goals for this month are: eat minimum of 4 per day, eliminate carbohydrates for evening/night meals, save cravings for cheat day, know cheat day for next visit, and measure protein with 65 grams being goal. Patient encouraged to call with questions and/or struggles as they may arise prior to next scheduled appointment.    2. Current comorbid conditions associated with her morbid obesity are reported to be stable on her current treatment regimen and medications. We anticipate the comorbid conditions to improve as we address her morbid obesity.    Follow up in 1 month for a weight recheck.    Kristin Mary, GRACIELA    05/30/19  11:26 AM  Patient Care Team:  Tere Mcgill MD as PCP - General (Internal Medicine)  Emiliana Murphy MA as Medical Assistant    "

## 2019-06-26 ENCOUNTER — OFFICE VISIT (OUTPATIENT)
Dept: BARIATRICS/WEIGHT MGMT | Facility: CLINIC | Age: 44
End: 2019-06-26

## 2019-06-26 VITALS
TEMPERATURE: 97.8 F | BODY MASS INDEX: 43.85 KG/M2 | SYSTOLIC BLOOD PRESSURE: 132 MMHG | DIASTOLIC BLOOD PRESSURE: 82 MMHG | HEIGHT: 65 IN | OXYGEN SATURATION: 98 % | HEART RATE: 79 BPM | WEIGHT: 263.2 LBS

## 2019-06-26 DIAGNOSIS — I10 ESSENTIAL HYPERTENSION: ICD-10-CM

## 2019-06-26 DIAGNOSIS — G47.33 OBSTRUCTIVE SLEEP APNEA SYNDROME: ICD-10-CM

## 2019-06-26 DIAGNOSIS — E66.01 CLASS 3 SEVERE OBESITY DUE TO EXCESS CALORIES WITH SERIOUS COMORBIDITY AND BODY MASS INDEX (BMI) OF 40.0 TO 44.9 IN ADULT (HCC): Primary | ICD-10-CM

## 2019-06-26 DIAGNOSIS — E66.9 DIABETES MELLITUS TYPE 2 IN OBESE (HCC): ICD-10-CM

## 2019-06-26 DIAGNOSIS — E11.69 DIABETES MELLITUS TYPE 2 IN OBESE (HCC): ICD-10-CM

## 2019-06-26 PROBLEM — E66.813 CLASS 3 SEVERE OBESITY DUE TO EXCESS CALORIES WITH SERIOUS COMORBIDITY AND BODY MASS INDEX (BMI) OF 40.0 TO 44.9 IN ADULT: Status: ACTIVE | Noted: 2019-06-26

## 2019-06-26 PROCEDURE — 99214 OFFICE O/P EST MOD 30 MIN: CPT | Performed by: NURSE PRACTITIONER

## 2019-06-26 NOTE — PROGRESS NOTES
Patient Care Team:  Tere Mcgill MD as PCP - General (Internal Medicine)  Emiliana Murphy MA as Medical Assistant    Reason for Visit:  Medical Weight Loss    Subjective        Kristine Veloz is a 44 y.o. year old female who is here for follow-up and continued medical management of morbid obesity. Her current Body mass index is 43.8 kg/m². She states she suffers from hypertension  and morbid obesity due to weight gain. She is currently following the 4 meals/day diet prescription. Kristine Veloz previously agreed to incorporate the prescription as provided, while also increasing physical exercise. Patient states she has struggled with getting the 4th meal in but otherwise has been successful at following the meal prescription as provided. She has been walking for exercise 2-3 times per week for 30 minutes. Kristine Veloz also states she has been drinking 64 ounces of water and is unsure on grams of protein intake per day. As a result, she has lost 9 lbs of weight since her last visit.      Review of Systems  Negative except the below listed  General ROS: positive for  - night sweats  Psychological ROS: positive for - anxiety and depression  Endocrine ROS: positive for - high blood sugars  Musculoskeletal ROS: positive for - joint pain    History  Past Medical History:   Diagnosis Date   • Abnormal thyroid function test 6/1/2018   • Anxiety    • Back pain    • Depression    • Diabetes mellitus (CMS/HCC)    • Hypertension    • Secondary hyperparathyroidism, non-renal (CMS/HCC) 6/1/2018   • Sleep apnea     CPAP    • Vitamin D deficiency 6/1/2018     Past Surgical History:   Procedure Laterality Date   • CHOLECYSTECTOMY  08/2008    Lap      Family History   Problem Relation Age of Onset   • Arthritis Mother    • Heart disease Mother    • Heart disease Father    • Arthritis Maternal Grandmother    • Diabetes Maternal Grandmother    • Heart disease Maternal Grandmother    • Arthritis Maternal  Grandfather    • Arthritis Paternal Grandfather    • Thyroid disease Neg Hx      Social History     Tobacco Use   • Smoking status: Never Smoker   • Smokeless tobacco: Never Used   Substance Use Topics   • Alcohol use: No   • Drug use: No       (Not in a hospital admission)  Allergies:  Penicillins      Current Outpatient Medications:   •  Atorvastatin Calcium (LIPITOR PO), Take 10 mg by mouth Daily., Disp: , Rfl:   •  Cholecalciferol 00915 units tablet, 50 thousand units po weekly, Disp: 12 tablet, Rfl: 11  •  hydrochlorothiazide (HYDRODIURIL) 25 MG tablet, Take 25 mg by mouth Daily., Disp: , Rfl:   •  loratadine (ALLERGY) 10 MG tablet, Take 10 mg by mouth Daily., Disp: , Rfl:   •  metFORMIN (GLUCOPHAGE) 500 MG tablet, Take 500 mg by mouth 2 (Two) Times a Day With Meals., Disp: , Rfl:   •  venlafaxine (EFFEXOR) 75 MG tablet, Take 75 mg by mouth 2 (Two) Times a Day., Disp: , Rfl:     Objective     Vital Signs  Temp:  [97.8 °F (36.6 °C)] 97.8 °F (36.6 °C)  Heart Rate:  [79] 79  BP: (132)/(82) 132/82  Body mass index is 43.8 kg/m².      06/26/19  1142   Weight: 119 kg (263 lb 3.2 oz)       Physical Exam:  HEENT: extra ocular movement intact  Respiratory:appears well, vitals normal, no respiratory distress, acyanotic, normal RR, chest clear, no wheezing, crepitations, rhonchi, normal symmetric air entry  Cardiovascular: Regular rate and rhythm, S1, S2 normal, no murmur, click, rub or gallop  GI: Soft, non-tender, normal bowel sounds; no bruits, organomegaly or masses. Abnormal shape: Obese  Musculoskeletal: inspection - no abnormality, range of motion normal  Neurologic: alert, oriented, normal speech, no focal findings or movement disorder noted       Results Review:   None        Assessment/Plan   Encounter Diagnoses   Name Primary?   • Class 3 severe obesity due to excess calories with serious comorbidity and body mass index (BMI) of 40.0 to 44.9 in adult (CMS/[facility]) Yes   • Essential hypertension    • Obstructive  sleep apnea syndrome    • Diabetes mellitus type 2 in obese (CMS/[facility])          1. Kristine Veloz was seen today for follow-up, obesity, nutrition counseling and weight loss. She has lost 9 lbs of weight since her last visit, making her BMI 43.8. Today we discussed consistency with healthy changes in lifestyle, diet, and exercise for long term success. Kristine Veloz had received handouts to her explaining the recommendation on portion sizes/appetite control/reading nutrition labels. Intensive behavioral therapy for obesity was done today as well.     Goals for this month are: use protein packet given last visit to measure daily grams of protein intake with 65 g/day being goal, continue trying to get fourth meal in, and continue incorporating healthy behaviors implemented thus far. Patient encouraged to call with questions and/or struggles as they may arise prior to next scheduled appointment.     2. Current comorbid conditions associated with her morbid obesity are reported to be stable on her current treatment regimen and medications. We anticipate the comorbid conditions to improve as we address her morbid obesity.    Follow up in 1 month for a weight recheck.    GRACIELA Pak    06/26/19  12:45 PM  Patient Care Team:  Tere Mcgill MD as PCP - General (Internal Medicine)  Emiliana Murphy MA as Medical Assistant

## 2019-07-29 ENCOUNTER — OFFICE VISIT (OUTPATIENT)
Dept: BARIATRICS/WEIGHT MGMT | Facility: CLINIC | Age: 44
End: 2019-07-29

## 2019-07-29 ENCOUNTER — HOSPITAL ENCOUNTER (OUTPATIENT)
Dept: CARDIOLOGY | Facility: HOSPITAL | Age: 44
Discharge: HOME | End: 2019-07-29
Admitting: NURSE PRACTITIONER

## 2019-07-29 VITALS
HEART RATE: 78 BPM | DIASTOLIC BLOOD PRESSURE: 84 MMHG | SYSTOLIC BLOOD PRESSURE: 121 MMHG | WEIGHT: 259.4 LBS | TEMPERATURE: 99.5 F | HEIGHT: 65 IN | BODY MASS INDEX: 43.22 KG/M2 | OXYGEN SATURATION: 96 %

## 2019-07-29 DIAGNOSIS — I10 ESSENTIAL HYPERTENSION: ICD-10-CM

## 2019-07-29 DIAGNOSIS — Z01.818 ENCOUNTER FOR OTHER PREPROCEDURAL EXAMINATION: ICD-10-CM

## 2019-07-29 DIAGNOSIS — E66.01 CLASS 3 SEVERE OBESITY DUE TO EXCESS CALORIES WITH SERIOUS COMORBIDITY AND BODY MASS INDEX (BMI) OF 40.0 TO 44.9 IN ADULT (HCC): ICD-10-CM

## 2019-07-29 DIAGNOSIS — E66.01 CLASS 3 SEVERE OBESITY DUE TO EXCESS CALORIES WITH SERIOUS COMORBIDITY AND BODY MASS INDEX (BMI) OF 40.0 TO 44.9 IN ADULT (HCC): Primary | ICD-10-CM

## 2019-07-29 DIAGNOSIS — G47.33 OBSTRUCTIVE SLEEP APNEA SYNDROME: ICD-10-CM

## 2019-07-29 DIAGNOSIS — E11.69 DIABETES MELLITUS TYPE 2 IN OBESE (HCC): ICD-10-CM

## 2019-07-29 DIAGNOSIS — E66.9 DIABETES MELLITUS TYPE 2 IN OBESE (HCC): ICD-10-CM

## 2019-07-29 PROCEDURE — 93010 ELECTROCARDIOGRAM REPORT: CPT | Performed by: INTERNAL MEDICINE

## 2019-07-29 PROCEDURE — 99214 OFFICE O/P EST MOD 30 MIN: CPT | Performed by: NURSE PRACTITIONER

## 2019-07-29 PROCEDURE — 93005 ELECTROCARDIOGRAM TRACING: CPT | Performed by: NURSE PRACTITIONER

## 2019-07-29 RX ORDER — FERROUS SULFATE 325(65) MG
1 TABLET ORAL DAILY
COMMUNITY
Start: 2019-07-03

## 2019-07-29 NOTE — PROGRESS NOTES
Patient Care Team:  Tere Mcgill MD as PCP - General (Internal Medicine)  Emiliana Murphy MA as Medical Assistant    Reason for Visit:  Surgical Weight Loss    Subjective        Kristine Veloz is a 44 y.o. year old female who is here for follow-up and continued medical management of morbid obesity. Her current Body mass index is 43.17 kg/m². She states she suffers from high blood pressure, diabetes mellitus, sleep apnea, and morbid obesity due to weight gain. She is currently following the 4 meals/day diet prescription. Kristine Veloz previously agreed to incorporate the prescription as provided, while also increasing physical exercise. Patient states she has been successful at eating 4 meals/day and has been walking 2-3 times per week for 30+ minutes for exercise. Kristine Veloz also states she has been drinking 64+ ounces of water and is getting 55+ grams of protein intake per day. She has lost 4 lbs of weight since her last visit.    Review of Systems  Negative except the below listed  General ROS: positive for  - night sweats and sleep disturbance  Ophthalmic ROS: positive for - uses glasses  Respiratory ROS: positive for - snoring when not wearing cpap  Musculoskeletal ROS: positive for - back and knee pain  Dermatological ROS: positive for poor wound healing    History  Past Medical History:   Diagnosis Date   • Abnormal thyroid function test 6/1/2018   • Anxiety    • Back pain    • Depression    • Diabetes mellitus (CMS/HCC)    • Hypertension    • Secondary hyperparathyroidism, non-renal (CMS/HCC) 6/1/2018   • Sleep apnea     CPAP    • Vitamin D deficiency 6/1/2018     Past Surgical History:   Procedure Laterality Date   • CHOLECYSTECTOMY  08/2008    Lap      Family History   Problem Relation Age of Onset   • Arthritis Mother    • Heart disease Mother    • Heart disease Father    • Arthritis Maternal Grandmother    • Diabetes Maternal Grandmother    • Heart disease Maternal  Grandmother    • Arthritis Maternal Grandfather    • Arthritis Paternal Grandfather    • Thyroid disease Neg Hx      Social History     Tobacco Use   • Smoking status: Never Smoker   • Smokeless tobacco: Never Used   Substance Use Topics   • Alcohol use: No   • Drug use: No       (Not in a hospital admission)  Allergies:  Penicillins      Current Outpatient Medications:   •  Atorvastatin Calcium (LIPITOR PO), Take 10 mg by mouth Daily., Disp: , Rfl:   •  Cholecalciferol 39573 units tablet, 50 thousand units po weekly, Disp: 12 tablet, Rfl: 11  •  hydrochlorothiazide (HYDRODIURIL) 25 MG tablet, Take 25 mg by mouth Daily., Disp: , Rfl:   •  loratadine (ALLERGY) 10 MG tablet, Take 10 mg by mouth Daily., Disp: , Rfl:   •  metFORMIN (GLUCOPHAGE) 500 MG tablet, Take 500 mg by mouth 2 (Two) Times a Day With Meals., Disp: , Rfl:   •  venlafaxine (EFFEXOR) 75 MG tablet, Take 75 mg by mouth Daily., Disp: , Rfl:   •  FEROSUL 325 (65 Fe) MG tablet, Take 1 capsule by mouth Daily., Disp: , Rfl:     Objective     Vital Signs  Temp:  [99.5 °F (37.5 °C)] 99.5 °F (37.5 °C)  Heart Rate:  [78] 78  BP: (121)/(84) 121/84  Body mass index is 43.17 kg/m².      07/29/19  0940   Weight: 118 kg (259 lb 6.4 oz)       Physical Exam:  HEENT: extra ocular movement intact  Respiratory:appears well, vitals normal, no respiratory distress, acyanotic, normal RR, chest clear, no wheezing, crepitations, rhonchi, normal symmetric air entry  Cardiovascular: Regular rate and rhythm, S1, S2 normal, no murmur, click, rub or gallop  GI: Soft, non-tender, normal bowel sounds; no bruits, organomegaly or masses. Abnormal shape: Obese  Musculoskeletal: inspection - no abnormality, range of motion normal  Neurologic: alert, oriented, normal speech, no focal findings or movement disorder noted       Results Review:   None        Assessment/Plan   Encounter Diagnoses   Name Primary?   • Class 3 severe obesity due to excess calories with serious comorbidity and body  mass index (BMI) of 40.0 to 44.9 in adult (CMS/[facility]) Yes   • Essential hypertension    • Diabetes mellitus type 2 in obese (CMS/HCC)    • Obstructive sleep apnea syndrome    • Encounter for other preprocedural examination          1. Kristine Veloz was seen today for follow-up, obesity, nutrition counseling and weight loss. She has lost 4 lbs of weight since her last visit, making her BMI 43.2. Today we discussed consistency with healthy changes in lifestyle, diet, and exercise for long term success. Kristine Veloz had received handouts to her explaining the recommendation on portion sizes/appetite control/reading nutrition labels. Intensive behavioral therapy for obesity was done today as well.     Dr. Hendrickson and I believe this patient will be a good candidate for weight loss surgery.  He has discussed the Hina - Y Gastric Bypass, laparoscopic sleeve gastrectomy and the Laparoscopic Gastric Band procedures with the patient.  We discussed the benefits of the surgeries including the benefit of weight loss and the possible reversal of co-morbid conditions associated with morbid obesity. We have explained to the patient that prior to making a definitive decision on the type of surgery she will require an esophagogastroduodenoscopy with biopsies to assess for any contraindications for surgical weight loss.  The alternatives  include not doing anything, or pursuing an UGI series which only offers a diagnosis with potential less accuracy compared to EGD. The benefits of the EGD such as identifying the pathology and anatomy of the upper GI system and the complications and risks of the procedure.    The risk of the endoscopy were discussed in detail. We discussed the risk of perforation (one out of 9558-7377, riskier with dilation), bleeding (one out of 500), and the rare risks of infection, adverse reaction to anesthesia, respiratory failure, cardiac failure including MI and adverse reaction to medications, etc. We  discussed consequences that could occur if a risk were to develop such as the need for hospitalization, blood transfusion, surgical intervention, medications, pain, disability and death. The patient verbalizes understanding and agrees to proceed. such as bleeding, perforation, swallowing difficulties and gas bloat can occur after this procedure.Upon completion of our discussion and addressing and answering her questions to her satisfation, informed consent was obtained.  She will be scheduled accordingly for the esophagogastroduodenoscopy procedure.    Goals for this month are: increase protein intake to at least 65 g/day and continue incorporating healthy behaviors implemented thus far. Patient encouraged to call with questions and/or struggles as they may arise prior to next scheduled appointment.    2. Current comorbid conditions of hypertension, diabetes mellitus, and obstructive sleep apnea associated with her morbid obesity are reported to be stable on her current treatment regimen and medications. We anticipate the comorbid conditions to improve as we address her morbid obesity.    3. Encounter for pre-procedural examination- EKG, EGD, and psychology referral ordered today.     Follow up in 1 month for a weight recheck.    GRACIELA Pak    07/29/19  11:13 AM  Patient Care Team:  Tere Mcgill MD as PCP - General (Internal Medicine)  Emiliana Murphy MA as Medical Assistant

## 2019-07-30 PROBLEM — Z01.818 ENCOUNTER FOR OTHER PREPROCEDURAL EXAMINATION: Status: ACTIVE | Noted: 2019-07-30

## 2019-08-26 ENCOUNTER — OFFICE VISIT (OUTPATIENT)
Dept: BARIATRICS/WEIGHT MGMT | Facility: CLINIC | Age: 44
End: 2019-08-26

## 2019-08-26 VITALS
TEMPERATURE: 97.4 F | DIASTOLIC BLOOD PRESSURE: 85 MMHG | HEIGHT: 65 IN | OXYGEN SATURATION: 98 % | SYSTOLIC BLOOD PRESSURE: 121 MMHG | BODY MASS INDEX: 43.32 KG/M2 | WEIGHT: 260 LBS | HEART RATE: 73 BPM

## 2019-08-26 DIAGNOSIS — G47.33 OBSTRUCTIVE SLEEP APNEA SYNDROME: ICD-10-CM

## 2019-08-26 DIAGNOSIS — E66.9 DIABETES MELLITUS TYPE 2 IN OBESE (HCC): ICD-10-CM

## 2019-08-26 DIAGNOSIS — E66.01 CLASS 3 SEVERE OBESITY DUE TO EXCESS CALORIES WITH SERIOUS COMORBIDITY AND BODY MASS INDEX (BMI) OF 40.0 TO 44.9 IN ADULT (HCC): Primary | ICD-10-CM

## 2019-08-26 DIAGNOSIS — I10 ESSENTIAL HYPERTENSION: ICD-10-CM

## 2019-08-26 DIAGNOSIS — E11.69 DIABETES MELLITUS TYPE 2 IN OBESE (HCC): ICD-10-CM

## 2019-08-26 PROCEDURE — 99214 OFFICE O/P EST MOD 30 MIN: CPT | Performed by: NURSE PRACTITIONER

## 2019-08-26 NOTE — PROGRESS NOTES
Patient Care Team:  Tere Mcgill MD as PCP - General (Internal Medicine)  Emiliana Murphy MA as Medical Assistant    Reason for Visit: Surgical Weight Loss    Subjective        Kristine Veloz is a 44 y.o. year old female who is here for follow-up and continued medical management of morbid obesity. Her current Body mass index is 43.27 kg/m². She states she suffers from high blood pressure, diabetes, and morbid obesity due to weight gain. She is currently following the 4 meals/day diet prescription. Kristine Veloz previously agreed to incorporate the prescription as provided, while also increasing physical exercise. Patient states she has been successful at limiting carbohydrates after lunch, avoiding sodas, and eating 4 times per day. She has not been exercising. Kristine Veloz also states she has been drinking 64+ ounces of water and 58+ grams of protein intake per day. She has struggled with some unhealthy cravings this month but that has subsided. She has gained 1 lb of weight since her last visit.    Review of Systems  Negative except the below listed  Psychological ROS: positive for - anxiety and depression  Respiratory ROS: positive for - snoring  Musculoskeletal ROS: positive for - pain in back, shoulder, and knee  Dermatological ROS: positive for poor wound healing    History  Past Medical History:   Diagnosis Date   • Abnormal thyroid function test 6/1/2018   • Anxiety    • Back pain    • Depression    • Diabetes mellitus (CMS/HCC)    • Hypertension    • Secondary hyperparathyroidism, non-renal (CMS/HCC) 6/1/2018   • Sleep apnea     CPAP    • Vitamin D deficiency 6/1/2018     Past Surgical History:   Procedure Laterality Date   • CHOLECYSTECTOMY  08/2008    Lap      Family History   Problem Relation Age of Onset   • Arthritis Mother    • Heart disease Mother    • Heart disease Father    • Arthritis Maternal Grandmother    • Diabetes Maternal Grandmother    •  Heart disease Maternal Grandmother    • Arthritis Maternal Grandfather    • Arthritis Paternal Grandfather    • Thyroid disease Neg Hx      Social History     Tobacco Use   • Smoking status: Never Smoker   • Smokeless tobacco: Never Used   Substance Use Topics   • Alcohol use: No   • Drug use: No       (Not in a hospital admission)  Allergies:  Penicillins      Current Outpatient Medications:   •  Atorvastatin Calcium (LIPITOR PO), Take 10 mg by mouth Daily., Disp: , Rfl:   •  FEROSUL 325 (65 Fe) MG tablet, Take 1 capsule by mouth Daily., Disp: , Rfl:   •  hydrochlorothiazide (HYDRODIURIL) 25 MG tablet, Take 25 mg by mouth Daily., Disp: , Rfl:   •  loratadine (ALLERGY) 10 MG tablet, Take 10 mg by mouth Daily., Disp: , Rfl:   •  metFORMIN (GLUCOPHAGE) 500 MG tablet, Take 500 mg by mouth 2 (Two) Times a Day With Meals., Disp: , Rfl:   •  venlafaxine (EFFEXOR) 75 MG tablet, Take 75 mg by mouth Daily., Disp: , Rfl:   •  Cholecalciferol 88130 units tablet, 50 thousand units po weekly, Disp: 12 tablet, Rfl: 11    Objective     Vital Signs  Temp:  [97.4 °F (36.3 °C)] 97.4 °F (36.3 °C)  Heart Rate:  [73] 73  BP: (121)/(85) 121/85  Body mass index is 43.27 kg/m².      08/26/19  1322   Weight: 118 kg (260 lb)       Physical Exam:  HEENT: extra ocular movement intact  Respiratory:appears well, vitals normal, no respiratory distress, acyanotic, normal RR, chest clear, no wheezing, crepitations, rhonchi, normal symmetric air entry  Cardiovascular: Regular rate and rhythm, S1, S2 normal, no murmur, click, rub or gallop  GI: Soft, non-tender, normal bowel sounds; no bruits, organomegaly or masses. Abnormal shape: Obese  Musculoskeletal: inspection - no abnormality, range of motion normal  Neurologic: alert, oriented, normal speech, no focal findings or movement disorder noted       Results Review:   None        Assessment/Plan   Encounter Diagnoses   Name Primary?   • Class 3 severe obesity due to excess calories with serious  comorbidity and body mass index (BMI) of 40.0 to 44.9 in adult (CMS/[facility]) Yes   • Essential hypertension    • Obstructive sleep apnea syndrome    • Diabetes mellitus type 2 in obese (CMS/[facility])          1. Kristine Veloz was seen today for follow-up, obesity, nutrition counseling and weight loss. She has gained 1 lb of weight since her last visit, making her BMI 43.2. Today we discussed consistency with healthy changes in lifestyle, diet, and exercise for long term success. Kristine Veloz had received handouts to her explaining the recommendation on portion sizes/appetite control/reading nutrition labels. Intensive behavioral therapy for obesity was done today as well.    Goals for this month are: eliminate carbohydrates after lunch, increase protein intake to at least 65 g/day, and continue to incorporate healthy behaviors implemented thus far. Patient encouraged to call with questions and/or struggles as they may arise prior to next scheduled appointment.    2. Current comorbid conditions of hypertension, ROGER, and diabetes associated with her morbid obesity are reported to be stable on her current treatment regimen and medications. We anticipate the comorbid conditions to improve as we address her morbid obesity.    Follow up in 1 month with Dr. Hendrickson for a weight recheck and to discuss EGD results and surgery recommendations.    GRACIELA Pak    08/26/19  2:24 PM  Patient Care Team:  Tere Mcgill MD as PCP - General (Internal Medicine)  Emiliana Murphy MA as Medical Assistant

## 2019-08-28 ENCOUNTER — TELEPHONE (OUTPATIENT)
Dept: BARIATRICS/WEIGHT MGMT | Facility: CLINIC | Age: 44
End: 2019-08-28

## 2019-08-28 NOTE — TELEPHONE ENCOUNTER
EGD Procedure     Patient called to check on her meds and instructions on her EGD.       Patient notified of their EGD procedure with Dr Emeka LAURA Process-EGD Reminder/Instructions       Instructions:     1) Eat normal the day before your procedure until 8 p.m. in the evening.    2) Beginning at 8pm clear liquids only-no Red or Pink in Color   3) Nothing to eat or drink after midnight.  4) Bring a list of medications.   5) Advised that they must bring a  as that IV sedation is being used.   6) All meds can be held til after her procedure       Kristin Palacio notified

## 2019-09-03 ENCOUNTER — HOSPITAL ENCOUNTER (OUTPATIENT)
Facility: HOSPITAL | Age: 44
Setting detail: HOSPITAL OUTPATIENT SURGERY
Discharge: HOME | End: 2019-09-03
Attending: SURGERY | Admitting: SURGERY

## 2019-09-03 ENCOUNTER — ANESTHESIA EVENT (OUTPATIENT)
Dept: GASTROENTEROLOGY | Facility: HOSPITAL | Age: 44
End: 2019-09-03

## 2019-09-03 ENCOUNTER — ANESTHESIA (OUTPATIENT)
Dept: GASTROENTEROLOGY | Facility: HOSPITAL | Age: 44
End: 2019-09-03

## 2019-09-03 VITALS
DIASTOLIC BLOOD PRESSURE: 68 MMHG | BODY MASS INDEX: 47.11 KG/M2 | WEIGHT: 256 LBS | HEIGHT: 62 IN | SYSTOLIC BLOOD PRESSURE: 116 MMHG | HEART RATE: 75 BPM | TEMPERATURE: 97.2 F | OXYGEN SATURATION: 98 % | RESPIRATION RATE: 18 BRPM

## 2019-09-03 DIAGNOSIS — Z01.818 ENCOUNTER FOR OTHER PREPROCEDURAL EXAMINATION: ICD-10-CM

## 2019-09-03 DIAGNOSIS — E66.01 CLASS 3 SEVERE OBESITY DUE TO EXCESS CALORIES WITH SERIOUS COMORBIDITY AND BODY MASS INDEX (BMI) OF 40.0 TO 44.9 IN ADULT (HCC): ICD-10-CM

## 2019-09-03 PROBLEM — K31.7 GASTRIC POLYPS: Status: ACTIVE | Noted: 2019-09-03

## 2019-09-03 LAB
GLUCOSE BLDC GLUCOMTR-MCNC: 115 MG/DL (ref 70–130)
HCG SERPL QL: NEGATIVE

## 2019-09-03 PROCEDURE — 25010000002 PROPOFOL 10 MG/ML EMULSION: Performed by: NURSE ANESTHETIST, CERTIFIED REGISTERED

## 2019-09-03 PROCEDURE — 87081 CULTURE SCREEN ONLY: CPT | Performed by: SURGERY

## 2019-09-03 PROCEDURE — 84703 CHORIONIC GONADOTROPIN ASSAY: CPT | Performed by: ANESTHESIOLOGY

## 2019-09-03 PROCEDURE — 82962 GLUCOSE BLOOD TEST: CPT

## 2019-09-03 PROCEDURE — 43239 EGD BIOPSY SINGLE/MULTIPLE: CPT | Performed by: SURGERY

## 2019-09-03 PROCEDURE — 88305 TISSUE EXAM BY PATHOLOGIST: CPT | Performed by: SURGERY

## 2019-09-03 RX ORDER — LIDOCAINE HYDROCHLORIDE 20 MG/ML
INJECTION, SOLUTION INFILTRATION; PERINEURAL AS NEEDED
Status: DISCONTINUED | OUTPATIENT
Start: 2019-09-03 | End: 2019-09-03 | Stop reason: SURG

## 2019-09-03 RX ORDER — SODIUM CHLORIDE 0.9 % (FLUSH) 0.9 %
10 SYRINGE (ML) INJECTION AS NEEDED
Status: DISCONTINUED | OUTPATIENT
Start: 2019-09-03 | End: 2019-09-03 | Stop reason: HOSPADM

## 2019-09-03 RX ORDER — SODIUM CHLORIDE 9 MG/ML
500 INJECTION, SOLUTION INTRAVENOUS CONTINUOUS PRN
Status: DISCONTINUED | OUTPATIENT
Start: 2019-09-03 | End: 2019-09-03 | Stop reason: HOSPADM

## 2019-09-03 RX ORDER — PROPOFOL 10 MG/ML
VIAL (ML) INTRAVENOUS AS NEEDED
Status: DISCONTINUED | OUTPATIENT
Start: 2019-09-03 | End: 2019-09-03 | Stop reason: SURG

## 2019-09-03 RX ADMIN — PROPOFOL 40 MG: 10 INJECTION, EMULSION INTRAVENOUS at 09:24

## 2019-09-03 RX ADMIN — PROPOFOL 40 MG: 10 INJECTION, EMULSION INTRAVENOUS at 09:25

## 2019-09-03 RX ADMIN — PROPOFOL 70 MG: 10 INJECTION, EMULSION INTRAVENOUS at 09:23

## 2019-09-03 RX ADMIN — SODIUM CHLORIDE 500 ML: 9 INJECTION, SOLUTION INTRAVENOUS at 08:46

## 2019-09-03 RX ADMIN — LIDOCAINE HYDROCHLORIDE 100 MG: 20 INJECTION, SOLUTION INFILTRATION; PERINEURAL at 09:23

## 2019-09-03 NOTE — ANESTHESIA PREPROCEDURE EVALUATION
Anesthesia Evaluation     Patient summary reviewed and Nursing notes reviewed   no history of anesthetic complications:  NPO Solid Status: > 8 hours  NPO Liquid Status: > 8 hours           Airway   Mallampati: III  TM distance: >3 FB  Neck ROM: full  Possible difficult intubation  Dental      Pulmonary    (+) sleep apnea on CPAP,   (-) asthma, not a smoker  Cardiovascular   Exercise tolerance: good (4-7 METS)    ECG reviewed    (+) hypertension,   (-) CAD      Neuro/Psych  (+) psychiatric history Anxiety and Depression,     (-) seizures, TIA, CVA  GI/Hepatic/Renal/Endo    (+) morbid obesity,  diabetes mellitus,   (-) liver disease, no renal disease    ROS Comment: Secondary hyperparathyroid, seeing endocrine in [place]    Musculoskeletal     Abdominal    Substance History      OB/GYN          Other                        Anesthesia Plan    ASA 3     MAC     intravenous induction   Anesthetic plan, all risks, benefits, and alternatives have been provided, discussed and informed consent has been obtained with: patient.

## 2019-09-03 NOTE — BRIEF OP NOTE
ESOPHAGOGASTRODUODENOSCOPY WITH ANESTHESIA  Progress Note    Kristine Veloz  9/3/2019    Pre-op Diagnosis:   Class 3 severe obesity due to excess calories with serious comorbidity and body mass index (BMI) of 40.0 to 44.9 in adult (CMS/[facility]) [E66.01, Z68.41]  Encounter for other preprocedural examination [Z01.818]       Post-Op Diagnosis Codes:     * Class 3 severe obesity due to excess calories with serious comorbidity and body mass index (BMI) of 40.0 to 44.9 in adult (CMS/[facility]) [E66.01, Z68.41]     * Encounter for other preprocedural examination [Z01.818]     * Gastric polyps [K31.7]    Procedure/CPT® Codes:      Procedure(s):  ESOPHAGOGASTRODUODENOSCOPY WITH ANESTHESIA    Surgeon(s):  Luc Hendrickson MD    Anesthesia: Monitored Anesthesia Care    Staff:   Endo Technician: Bj Vicente  Endo Nurse: Sandra Yoon RN    Estimated Blood Loss: minimal    Urine Voided: * No values recorded between 9/3/2019 12:00 AM and 9/3/2019  9:27 AM *    Specimens:                Specimens     ID Source Type Tests Collected By Collected At Frozen?      A Gastric, Body Tissue · TISSUE PATHOLOGY EXAM   Luc Hendrickson MD 9/3/19 0926      Description: bx gastric polyps    Comment: Nano                Findings: as above    Complications: none      Luc Hendrickson MD     Date: 9/3/2019  Time: 9:27 AM

## 2019-09-03 NOTE — ANESTHESIA POSTPROCEDURE EVALUATION
Patient: Kristine Veloz    Procedure Summary     Date:  09/03/19 Room / Location:  [facility] ENDOSCOPY 2 / [facility]    Anesthesia Start:  0921 Anesthesia Stop:  0927    Procedure:  ESOPHAGOGASTRODUODENOSCOPY WITH ANESTHESIA (N/A ) Diagnosis:       Class 3 severe obesity due to excess calories with serious comorbidity and body mass index (BMI) of 40.0 to 44.9 in adult (CMS/[facility])      Encounter for other preprocedural examination      Gastric polyps      (Class 3 severe obesity due to excess calories with serious comorbidity and body mass index (BMI) of 40.0 to 44.9 in adult (CMS/[facility]) [E66.01, Z68.41])      (Encounter for other preprocedural examination [Z01.818])    Surgeon:  Luc Hendrickson MD Provider:  Eduardo Hendrickson CRNA    Anesthesia Type:  MAC ASA Status:  3          Anesthesia Type: MAC  Last vitals  BP   138/80 (09/03/19 0813)   Temp   97.2 °F (36.2 °C) (09/03/19 0813)   Pulse   71 (09/03/19 0813)   Resp   18 (09/03/19 0813)     SpO2   98 % (09/03/19 0813)     Post Anesthesia Care and Evaluation    Patient location during evaluation: PHASE II  Patient participation: complete - patient participated  Level of consciousness: awake  Pain score: 0  Pain management: adequate  Airway patency: patent  Anesthetic complications: No anesthetic complications  PONV Status: none  Cardiovascular status: acceptable  Respiratory status: acceptable  Hydration status: acceptable

## 2019-09-04 LAB — UREASE TISS QL: NEGATIVE

## 2019-09-07 LAB
CYTO UR: NORMAL
LAB AP CASE REPORT: NORMAL
LAB AP CLINICAL INFORMATION: NORMAL
PATH REPORT.FINAL DX SPEC: NORMAL
PATH REPORT.GROSS SPEC: NORMAL

## 2019-09-16 ENCOUNTER — OFFICE VISIT (OUTPATIENT)
Dept: BARIATRICS/WEIGHT MGMT | Facility: CLINIC | Age: 44
End: 2019-09-16

## 2019-09-16 VITALS
DIASTOLIC BLOOD PRESSURE: 86 MMHG | WEIGHT: 256.6 LBS | BODY MASS INDEX: 42.75 KG/M2 | HEIGHT: 65 IN | OXYGEN SATURATION: 98 % | HEART RATE: 76 BPM | SYSTOLIC BLOOD PRESSURE: 136 MMHG | TEMPERATURE: 98.2 F

## 2019-09-16 DIAGNOSIS — I10 ESSENTIAL HYPERTENSION: ICD-10-CM

## 2019-09-16 DIAGNOSIS — E66.9 DIABETES MELLITUS TYPE 2 IN OBESE (HCC): ICD-10-CM

## 2019-09-16 DIAGNOSIS — E11.69 DIABETES MELLITUS TYPE 2 IN OBESE (HCC): ICD-10-CM

## 2019-09-16 DIAGNOSIS — E66.01 CLASS 3 SEVERE OBESITY DUE TO EXCESS CALORIES WITH SERIOUS COMORBIDITY AND BODY MASS INDEX (BMI) OF 45.0 TO 49.9 IN ADULT (HCC): Primary | ICD-10-CM

## 2019-09-16 DIAGNOSIS — G47.33 OBSTRUCTIVE SLEEP APNEA SYNDROME: ICD-10-CM

## 2019-09-16 PROCEDURE — 99213 OFFICE O/P EST LOW 20 MIN: CPT | Performed by: NURSE PRACTITIONER

## 2019-09-16 NOTE — PROGRESS NOTES
Patient Care Team:  Tere Mcgill MD as PCP - General (Internal Medicine)  Emiliana Murphy MA as Medical Assistant    Reason for Visit:  Surgical Weight Loss    Subjective        Kristine Veloz is a 44 y.o. year old female who is here for follow-up and continued medical management of morbid obesity. Her current Body mass index is 42.7 kg/m². She states she suffers from high blood pressure, sleep apnea, diabetes, and morbid obesity due to weight gain. She is currently following the 4 meals/day diet prescription. Kristine Veloz previously agreed to incorporate the prescription as provided, while also increasing physical exercise. Patient states she has been successful at avoiding sodas, eating 4 times per day, and limiting carbohydrates after lunch. She has been exercising by walking 2-3 times per week for 30 days. Kristine Veloz also states she has been drinking 60+ ounces of water and getting 62+ grams of protein intake per day. She has lost 4 lbs of weight since her last visit.    Review of Systems  Negative except the below listed  Psychological ROS: positive for - anxiety and depression  Endocrine ROS: positive for - hair pattern changes and high blood sugars  Respiratory ROS: positive for - snoring  Musculoskeletal ROS: positive for - shoulder and back pain  Dermatological ROS: positive for poor wound healing    History  Past Medical History:   Diagnosis Date   • Abnormal thyroid function test 6/1/2018   • Anxiety    • Back pain    • Depression    • Diabetes mellitus (CMS/HCC)    • Hypertension    • Secondary hyperparathyroidism, non-renal (CMS/HCC) 6/1/2018   • Sleep apnea     CPAP    • Vitamin D deficiency 6/1/2018     Past Surgical History:   Procedure Laterality Date   • CHOLECYSTECTOMY  08/2008    Lap    • ENDOSCOPY N/A 9/3/2019    Procedure: ESOPHAGOGASTRODUODENOSCOPY WITH ANESTHESIA;  Surgeon: Luc Hendrickson MD;  Location: [facility] ENDOSCOPY;  Service:  General     Family History   Problem Relation Age of Onset   • Arthritis Mother    • Heart disease Mother    • Heart disease Father    • Arthritis Maternal Grandmother    • Diabetes Maternal Grandmother    • Heart disease Maternal Grandmother    • Arthritis Maternal Grandfather    • Arthritis Paternal Grandfather    • Thyroid disease Neg Hx      Social History     Tobacco Use   • Smoking status: Never Smoker   • Smokeless tobacco: Never Used   Substance Use Topics   • Alcohol use: No   • Drug use: No       (Not in a hospital admission)  Allergies:  Penicillins      Current Outpatient Medications:   •  Atorvastatin Calcium (LIPITOR PO), Take 10 mg by mouth Daily., Disp: , Rfl:   •  FEROSUL 325 (65 Fe) MG tablet, Take 1 capsule by mouth Daily., Disp: , Rfl:   •  hydrochlorothiazide (HYDRODIURIL) 25 MG tablet, Take 25 mg by mouth Daily., Disp: , Rfl:   •  loratadine (ALLERGY) 10 MG tablet, Take 10 mg by mouth Daily., Disp: , Rfl:   •  metFORMIN (GLUCOPHAGE) 500 MG tablet, Take 500 mg by mouth 2 (Two) Times a Day With Meals., Disp: , Rfl:   •  venlafaxine (EFFEXOR) 75 MG tablet, Take 75 mg by mouth Daily., Disp: , Rfl:   •  Cholecalciferol 36889 units tablet, 50 thousand units po weekly, Disp: 12 tablet, Rfl: 11    Objective     Vital Signs  Temp:  [98.2 °F (36.8 °C)] 98.2 °F (36.8 °C)  Heart Rate:  [76] 76  BP: (136)/(86) 136/86  Body mass index is 42.7 kg/m².      09/16/19  0926   Weight: 116 kg (256 lb 9.6 oz)       Physical Exam:  HEENT: extra ocular movement intact  Respiratory:appears well, vitals normal, no respiratory distress, acyanotic, normal RR, chest clear, no wheezing, crepitations, rhonchi, normal symmetric air entry  Cardiovascular: Regular rate and rhythm, S1, S2 normal, no murmur, click, rub or gallop  GI: Soft, non-tender, normal bowel sounds; no bruits, organomegaly or masses. Abnormal shape: Obese  Musculoskeletal: inspection - no abnormality, range of motion normal  Neurologic: alert, oriented,  normal speech, no focal findings or movement disorder noted       Results Review:   None        Assessment/Plan   Encounter Diagnoses   Name Primary?   • Class 3 severe obesity due to excess calories with serious comorbidity and body mass index (BMI) of 45.0 to 49.9 in adult (CMS/[facility]) Yes   • Obstructive sleep apnea syndrome    • Essential hypertension    • Diabetes mellitus type 2 in obese (CMS/[facility])          1. Kristine Veloz was seen today for follow-up, obesity, nutrition counseling and weight loss. She has lost 4 lbs of weight since her last visit, making her BMI 42.7. Today we discussed consistency with healthy changes in lifestyle, diet, and exercise for long term success. Kristine Veloz had received handouts to her explaining the recommendation on portion sizes/appetite control/reading nutrition labels. Intensive behavioral therapy for obesity was done today as well.     Goals for this month are: continue to incorporate healthy behaviors implemented thus far. Patient encouraged to call with questions and/or struggles as they may arise prior to next scheduled appointment.    2. Current comorbid conditions of ROGER, hypertension, and DM associated with her morbid obesity are reported to be stable on her current treatment regimen and medications. We anticipate the comorbid conditions to improve as we address her morbid obesity.    Follow up in 1 month with Dr. Hendrickson for a weight recheck and submit for surgery.    GRACIELA Pak    09/16/19  11:45 AM  Patient Care Team:  Tere Mcgill MD as PCP - General (Internal Medicine)  Emiliana Murphy MA as Medical Assistant

## 2019-10-01 ENCOUNTER — OFFICE VISIT (OUTPATIENT)
Dept: BARIATRICS/WEIGHT MGMT | Facility: CLINIC | Age: 44
End: 2019-10-01

## 2019-10-01 ENCOUNTER — TELEPHONE (OUTPATIENT)
Dept: BARIATRICS/WEIGHT MGMT | Facility: CLINIC | Age: 44
End: 2019-10-01

## 2019-10-01 ENCOUNTER — PREP FOR SURGERY (OUTPATIENT)
Dept: OTHER | Facility: HOSPITAL | Age: 44
End: 2019-10-01

## 2019-10-01 VITALS
HEART RATE: 79 BPM | WEIGHT: 257 LBS | HEIGHT: 65 IN | BODY MASS INDEX: 42.82 KG/M2 | OXYGEN SATURATION: 98 % | TEMPERATURE: 98.2 F | SYSTOLIC BLOOD PRESSURE: 134 MMHG | DIASTOLIC BLOOD PRESSURE: 87 MMHG

## 2019-10-01 DIAGNOSIS — E08.00 DIABETES MELLITUS DUE TO UNDERLYING CONDITION WITH HYPEROSMOLARITY WITHOUT COMA, WITHOUT LONG-TERM CURRENT USE OF INSULIN (HCC): ICD-10-CM

## 2019-10-01 DIAGNOSIS — E66.01 CLASS 3 SEVERE OBESITY DUE TO EXCESS CALORIES WITH SERIOUS COMORBIDITY AND BODY MASS INDEX (BMI) OF 45.0 TO 49.9 IN ADULT (HCC): Primary | ICD-10-CM

## 2019-10-01 DIAGNOSIS — G47.33 OBSTRUCTIVE SLEEP APNEA SYNDROME: ICD-10-CM

## 2019-10-01 PROCEDURE — 99214 OFFICE O/P EST MOD 30 MIN: CPT | Performed by: SURGERY

## 2019-10-01 RX ORDER — BIOTIN 10 MG
1 TABLET ORAL DAILY
COMMUNITY

## 2019-10-01 RX ORDER — SCOLOPAMINE TRANSDERMAL SYSTEM 1 MG/1
1 PATCH, EXTENDED RELEASE TRANSDERMAL ONCE
Status: CANCELLED | OUTPATIENT
Start: 2019-10-01 | End: 2019-10-01

## 2019-10-01 RX ORDER — GABAPENTIN 250 MG/5ML
250 SOLUTION ORAL ONCE
Status: CANCELLED | OUTPATIENT
Start: 2019-10-01 | End: 2019-10-01

## 2019-10-01 RX ORDER — APREPITANT 40 MG/1
40 CAPSULE ORAL DAILY
Qty: 1 CAPSULE | Refills: 0 | Status: ON HOLD | OUTPATIENT
Start: 2019-10-01 | End: 2019-10-14

## 2019-10-01 NOTE — H&P (VIEW-ONLY)
Patient Care Team:  Tere Mcgill MD as PCP - General (Internal Medicine)  Emiliana Murphy MA as Medical Assistant    Reason for Visit:  Surgical Weight loss    Subjective     Patient is a 44 y.o. female presents with morbid obesity and her Body mass index is 42.77 kg/m².     She is here for discussion of surgical weight loss options.  She stated she has been with the disease of obesity for year(s).  She stated she suffers from sleep apnea, hypertension and morbid obesity due to her weight gain.  She stated that weight loss helps alleviate these symptoms.   She stated that she has tried multiple dietary regimens including completing a medically supervised weight loss program to help with weight loss.  She stated that she has attempted these conservative methods for weight loss without maintaining long term success.  Today she would like to discuss surgical weight loss options such as the Laparoscopic Sleeve Gastrectomy or the Laparoscopic R - Y Gastric Bypass.     Review of Systems  General ROS: positive for  - sleep disturbance  Psychological ROS: positive for - anxiety and depression  Ophthalmic ROS: negative  ENT ROS: negative  Hematological and Lymphatic ROS: positive for -hypercoagulable disease  Endocrine ROS: positive for - hair pattern changes  Respiratory ROS: no cough, shortness of breath, or wheezing  Cardiovascular ROS: no chest pain or dyspnea on exertion  Gastrointestinal ROS: no abdominal pain, change in bowel habits, or black or bloody stools  Genito-Urinary ROS: no dysuria, trouble voiding, or hematuria  Musculoskeletal ROS: positive for - joint pain    History  Past Medical History:   Diagnosis Date   • Abnormal thyroid function test 6/1/2018   • Anxiety    • Back pain    • Depression    • Diabetes mellitus (CMS/HCC)    • Hypertension    • Secondary hyperparathyroidism, non-renal (CMS/HCC) 6/1/2018   • Sleep apnea     CPAP    • Vitamin D deficiency 6/1/2018     Past Surgical  History:   Procedure Laterality Date   • CHOLECYSTECTOMY  08/2008    Lap    • ENDOSCOPY N/A 9/3/2019    Procedure: ESOPHAGOGASTRODUODENOSCOPY WITH ANESTHESIA;  Surgeon: Luc Hendrickson MD;  Location: [facility] ENDOSCOPY;  Service: General     Family History   Problem Relation Age of Onset   • Arthritis Mother    • Heart disease Mother    • Heart disease Father    • Arthritis Maternal Grandmother    • Diabetes Maternal Grandmother    • Heart disease Maternal Grandmother    • Arthritis Maternal Grandfather    • Arthritis Paternal Grandfather    • Thyroid disease Neg Hx      Social History     Tobacco Use   • Smoking status: Never Smoker   • Smokeless tobacco: Never Used   Substance Use Topics   • Alcohol use: No   • Drug use: No       (Not in a hospital admission)  Allergies:  Penicillins      Current Outpatient Medications:   •  Atorvastatin Calcium (LIPITOR PO), Take 10 mg by mouth Daily., Disp: , Rfl:   •  FEROSUL 325 (65 Fe) MG tablet, Take 1 capsule by mouth Daily., Disp: , Rfl:   •  hydrochlorothiazide (HYDRODIURIL) 25 MG tablet, Take 25 mg by mouth Daily., Disp: , Rfl:   •  loratadine (ALLERGY) 10 MG tablet, Take 10 mg by mouth Daily., Disp: , Rfl:   •  metFORMIN (GLUCOPHAGE) 500 MG tablet, Take 500 mg by mouth 2 (Two) Times a Day With Meals., Disp: , Rfl:   •  Multiple Vitamins-Minerals (MULTIVITAMIN ADULT) chewable tablet, Chew., Disp: , Rfl:   •  venlafaxine (EFFEXOR) 75 MG tablet, Take 75 mg by mouth Daily., Disp: , Rfl:   •  Cholecalciferol 77860 units tablet, 50 thousand units po weekly, Disp: 12 tablet, Rfl: 11    Objective     Vital Signs  Temp:  [98.2 °F (36.8 °C)] 98.2 °F (36.8 °C)  Heart Rate:  [79] 79  BP: (134)/(87) 134/87  Body mass index is 42.77 kg/m².      10/01/19  0909   Weight: 117 kg (257 lb)       Physical Exam:      HEENT: extra ocular movement intact  Respiratory: appears well, vitals normal, no respiratory distress, acyanotic, normal RR, chest clear, no wheezing, crepitations, rhonchi,  normal symmetric air entry  Cardiovascular: Regular rate and rhythm, S1, S2 normal, no murmur, click, rub or gallop  GI: Soft, non-tender, normal bowel sounds; no bruits, organomegaly or masses.  Abnormal shape: obese  Musculoskeletal: inspection - no abnormality  Neurologic: alert, oriented, normal speech, no focal findings or movement disorder noted       Results Review:   I reviewed the patient's new clinical results.        Assessment/Plan   Encounter Diagnoses   Name Primary?   • Class 3 severe obesity due to excess calories with serious comorbidity and body mass index (BMI) of 45.0 to 49.9 in adult (CMS/[facility]) Yes   • Diabetes mellitus due to underlying condition with hyperosmolarity without coma, without long-term current use of insulin (CMS/[facility])    • Obstructive sleep apnea syndrome        I believe this patient will be a good candidate for weight loss surgery.    She has chosen laparoscopic sleeve gastrectomy. I agree with this decision.  I have discussed the Hina - Y Gastric Bypass, laparoscopic sleeve gastrectomy and the Laparoscopic Gastric Band procedures to provide the alternatives which includes non surgical weight loss options as well.  We discussed the benefits of the surgeries including the benefit of weight loss and the possible reversal of co-morbid conditions associated with morbid obesity.  She is aware that the Sleeve procedure is not reversible.  We discussed the complications and risks which include the risk of perforation, leakage,bleeding, intra-abdominal organ injury, stenosis or ulcerations, the risk of venous thrombosis formation in the lungs, mesentery veins or lower extremities  leading to possible organ injury and death.   The patient was recently diagnosed with factor V Leiden disease and will require to be on Lovenox postoperatively.  Because of this disease she is at an increased risk for formation of venous thrombosis.  I explained to her the possibility that this procedure may not  "be performed laparoscopically and may require being converted to an opened procedure or aborted due to abnormal anatomy.  Also postoperatively there is a risk of increased GERD symptoms after this procedure.  I have explained if she develops intestinal metaplasia of her esophagus consideration for conversion to another weight loss procedure may be necessary.      The patient reports that her sleep apnea and diabetes have remained stable on their current treatment regimen.  Anticipate reversal of these comorbid conditions with reversal of her morbid obesity.    Leonel Report   As part of this patient's treatment plan I am prescribing controlled substances. The patient has been made aware of appropriate use of such medications, including potential risk of somnolence, limited ability to drive and /or work safely, and potential for dependence or overdose. It has also been made clear that these medications are for use by this patient only, without concomitant use of alcohol or other substances unless prescribed.    Kristine Allyssa Veloz has completed the educational program \"BOOT CAMP\" detailing terms of continued prescribing of controlled substances, including monitoring LEONEL reports, urine drug screening, and pill counts if necessary. Kristine Veloz is aware that inappropriate use will result in cessation of prescribing such medications.    LEONEL report has been reviewed.      History and physical exam exhibit continued safe and appropriate use of controlled substances.       Kristine Allyssa Cans understands the surgical procedures and the different surgical options that are available.  She understands the lifestyle changes that are required after surgery and has agreed to follow the guidelines outlined in the weight management program. Kristine Veloz also expressed understanding of the risks involved and had all of female questions answered and desires to proceed.    Upon completion of our " "discussion and addressing and answering her questions to her satisfaction, informed consent was obtained.   She will be scheduled accordingly for a laparoscopic Sleeve gastrectomy procedure.      I discussed the patient's findings and my recommendations with patient.     I have also recommended that she obtain completion of her \"boot camp\" course as well as her preoperative liver shrinking diet prior to surgery.    Dr. Luc Hendrickson MD [facility]    10/01/19  9:31 AM  Patient Care Team:  Tere Mcgill MD as PCP - General (Internal Medicine)  Emiliana Murphy MA as Medical Assistant    "

## 2019-10-01 NOTE — PROGRESS NOTES
Patient Care Team:  Tere Mcgill MD as PCP - General (Internal Medicine)  Emiliana Murphy MA as Medical Assistant    Reason for Visit:  Surgical Weight loss    Subjective     Patient is a 44 y.o. female presents with morbid obesity and her Body mass index is 42.77 kg/m².     She is here for discussion of surgical weight loss options.  She stated she has been with the disease of obesity for year(s).  She stated she suffers from sleep apnea, hypertension and morbid obesity due to her weight gain.  She stated that weight loss helps alleviate these symptoms.   She stated that she has tried multiple dietary regimens including completing a medically supervised weight loss program to help with weight loss.  She stated that she has attempted these conservative methods for weight loss without maintaining long term success.  Today she would like to discuss surgical weight loss options such as the Laparoscopic Sleeve Gastrectomy or the Laparoscopic R - Y Gastric Bypass.     Review of Systems  General ROS: positive for  - sleep disturbance  Psychological ROS: positive for - anxiety and depression  Ophthalmic ROS: negative  ENT ROS: negative  Hematological and Lymphatic ROS: positive for -hypercoagulable disease  Endocrine ROS: positive for - hair pattern changes  Respiratory ROS: no cough, shortness of breath, or wheezing  Cardiovascular ROS: no chest pain or dyspnea on exertion  Gastrointestinal ROS: no abdominal pain, change in bowel habits, or black or bloody stools  Genito-Urinary ROS: no dysuria, trouble voiding, or hematuria  Musculoskeletal ROS: positive for - joint pain    History  Past Medical History:   Diagnosis Date   • Abnormal thyroid function test 6/1/2018   • Anxiety    • Back pain    • Depression    • Diabetes mellitus (CMS/HCC)    • Hypertension    • Secondary hyperparathyroidism, non-renal (CMS/HCC) 6/1/2018   • Sleep apnea     CPAP    • Vitamin D deficiency 6/1/2018     Past Surgical  History:   Procedure Laterality Date   • CHOLECYSTECTOMY  08/2008    Lap    • ENDOSCOPY N/A 9/3/2019    Procedure: ESOPHAGOGASTRODUODENOSCOPY WITH ANESTHESIA;  Surgeon: Luc Hendrickson MD;  Location: [facility] ENDOSCOPY;  Service: General     Family History   Problem Relation Age of Onset   • Arthritis Mother    • Heart disease Mother    • Heart disease Father    • Arthritis Maternal Grandmother    • Diabetes Maternal Grandmother    • Heart disease Maternal Grandmother    • Arthritis Maternal Grandfather    • Arthritis Paternal Grandfather    • Thyroid disease Neg Hx      Social History     Tobacco Use   • Smoking status: Never Smoker   • Smokeless tobacco: Never Used   Substance Use Topics   • Alcohol use: No   • Drug use: No       (Not in a hospital admission)  Allergies:  Penicillins      Current Outpatient Medications:   •  Atorvastatin Calcium (LIPITOR PO), Take 10 mg by mouth Daily., Disp: , Rfl:   •  FEROSUL 325 (65 Fe) MG tablet, Take 1 capsule by mouth Daily., Disp: , Rfl:   •  hydrochlorothiazide (HYDRODIURIL) 25 MG tablet, Take 25 mg by mouth Daily., Disp: , Rfl:   •  loratadine (ALLERGY) 10 MG tablet, Take 10 mg by mouth Daily., Disp: , Rfl:   •  metFORMIN (GLUCOPHAGE) 500 MG tablet, Take 500 mg by mouth 2 (Two) Times a Day With Meals., Disp: , Rfl:   •  Multiple Vitamins-Minerals (MULTIVITAMIN ADULT) chewable tablet, Chew., Disp: , Rfl:   •  venlafaxine (EFFEXOR) 75 MG tablet, Take 75 mg by mouth Daily., Disp: , Rfl:   •  Cholecalciferol 25761 units tablet, 50 thousand units po weekly, Disp: 12 tablet, Rfl: 11    Objective     Vital Signs  Temp:  [98.2 °F (36.8 °C)] 98.2 °F (36.8 °C)  Heart Rate:  [79] 79  BP: (134)/(87) 134/87  Body mass index is 42.77 kg/m².      10/01/19  0909   Weight: 117 kg (257 lb)       Physical Exam:      HEENT: extra ocular movement intact  Respiratory: appears well, vitals normal, no respiratory distress, acyanotic, normal RR, chest clear, no wheezing, crepitations, rhonchi,  normal symmetric air entry  Cardiovascular: Regular rate and rhythm, S1, S2 normal, no murmur, click, rub or gallop  GI: Soft, non-tender, normal bowel sounds; no bruits, organomegaly or masses.  Abnormal shape: obese  Musculoskeletal: inspection - no abnormality  Neurologic: alert, oriented, normal speech, no focal findings or movement disorder noted       Results Review:   I reviewed the patient's new clinical results.        Assessment/Plan   Encounter Diagnoses   Name Primary?   • Class 3 severe obesity due to excess calories with serious comorbidity and body mass index (BMI) of 45.0 to 49.9 in adult (CMS/[facility]) Yes   • Diabetes mellitus due to underlying condition with hyperosmolarity without coma, without long-term current use of insulin (CMS/[facility])    • Obstructive sleep apnea syndrome        I believe this patient will be a good candidate for weight loss surgery.    She has chosen laparoscopic sleeve gastrectomy. I agree with this decision.  I have discussed the Hina - Y Gastric Bypass, laparoscopic sleeve gastrectomy and the Laparoscopic Gastric Band procedures to provide the alternatives which includes non surgical weight loss options as well.  We discussed the benefits of the surgeries including the benefit of weight loss and the possible reversal of co-morbid conditions associated with morbid obesity.  She is aware that the Sleeve procedure is not reversible.  We discussed the complications and risks which include the risk of perforation, leakage,bleeding, intra-abdominal organ injury, stenosis or ulcerations, the risk of venous thrombosis formation in the lungs, mesentery veins or lower extremities  leading to possible organ injury and death.   The patient was recently diagnosed with factor V Leiden disease and will require to be on Lovenox postoperatively.  Because of this disease she is at an increased risk for formation of venous thrombosis.  I explained to her the possibility that this procedure may not  "be performed laparoscopically and may require being converted to an opened procedure or aborted due to abnormal anatomy.  Also postoperatively there is a risk of increased GERD symptoms after this procedure.  I have explained if she develops intestinal metaplasia of her esophagus consideration for conversion to another weight loss procedure may be necessary.      The patient reports that her sleep apnea and diabetes have remained stable on their current treatment regimen.  Anticipate reversal of these comorbid conditions with reversal of her morbid obesity.    Leonel Report   As part of this patient's treatment plan I am prescribing controlled substances. The patient has been made aware of appropriate use of such medications, including potential risk of somnolence, limited ability to drive and /or work safely, and potential for dependence or overdose. It has also been made clear that these medications are for use by this patient only, without concomitant use of alcohol or other substances unless prescribed.    Kristine Allyssa Veloz has completed the educational program \"BOOT CAMP\" detailing terms of continued prescribing of controlled substances, including monitoring LEONEL reports, urine drug screening, and pill counts if necessary. Kristine Veloz is aware that inappropriate use will result in cessation of prescribing such medications.    LEONEL report has been reviewed.      History and physical exam exhibit continued safe and appropriate use of controlled substances.       Kristine Allyssa Cans understands the surgical procedures and the different surgical options that are available.  She understands the lifestyle changes that are required after surgery and has agreed to follow the guidelines outlined in the weight management program. Kristine Veloz also expressed understanding of the risks involved and had all of female questions answered and desires to proceed.    Upon completion of our " "discussion and addressing and answering her questions to her satisfaction, informed consent was obtained.   She will be scheduled accordingly for a laparoscopic Sleeve gastrectomy procedure.      I discussed the patient's findings and my recommendations with patient.     I have also recommended that she obtain completion of her \"boot camp\" course as well as her preoperative liver shrinking diet prior to surgery.    Dr. Luc Hendrickson MD [facility]    10/01/19  9:31 AM  Patient Care Team:  Tere Mcgill MD as PCP - General (Internal Medicine)  Emiliana Murphy MA as Medical Assistant    "

## 2019-10-01 NOTE — TELEPHONE ENCOUNTER
Patient called stating that Dr. Hendrickson wanted to know the dosage of her Lovenox. It is 40 mg -1 every 24 hours.

## 2019-10-04 ENCOUNTER — APPOINTMENT (OUTPATIENT)
Dept: PREADMISSION TESTING | Facility: HOSPITAL | Age: 44
End: 2019-10-04

## 2019-10-04 ENCOUNTER — TREATMENT (OUTPATIENT)
Dept: BARIATRICS/WEIGHT MGMT | Facility: CLINIC | Age: 44
End: 2019-10-04

## 2019-10-04 VITALS — BODY MASS INDEX: 41.77 KG/M2 | WEIGHT: 250.7 LBS | HEIGHT: 65 IN

## 2019-10-04 VITALS
HEIGHT: 65 IN | DIASTOLIC BLOOD PRESSURE: 74 MMHG | RESPIRATION RATE: 16 BRPM | HEART RATE: 79 BPM | OXYGEN SATURATION: 98 % | BODY MASS INDEX: 41.69 KG/M2 | WEIGHT: 250.22 LBS | SYSTOLIC BLOOD PRESSURE: 134 MMHG

## 2019-10-04 DIAGNOSIS — E66.01 CLASS 3 SEVERE OBESITY DUE TO EXCESS CALORIES WITH SERIOUS COMORBIDITY AND BODY MASS INDEX (BMI) OF 45.0 TO 49.9 IN ADULT (HCC): ICD-10-CM

## 2019-10-04 LAB
ALBUMIN SERPL-MCNC: 4.5 G/DL (ref 3.5–5.2)
ALBUMIN/GLOB SERPL: 1.3 G/DL
ALP SERPL-CCNC: 81 U/L (ref 39–117)
ALT SERPL W P-5'-P-CCNC: 23 U/L (ref 1–33)
ANION GAP SERPL CALCULATED.3IONS-SCNC: 11 MMOL/L (ref 5–15)
APTT PPP: 29.3 SECONDS (ref 24.1–35)
AST SERPL-CCNC: 26 U/L (ref 1–32)
BILIRUB SERPL-MCNC: 1 MG/DL (ref 0.2–1.2)
BUN BLD-MCNC: 14 MG/DL (ref 6–20)
BUN/CREAT SERPL: 25 (ref 7–25)
CALCIUM SPEC-SCNC: 9.5 MG/DL (ref 8.6–10.5)
CHLORIDE SERPL-SCNC: 99 MMOL/L (ref 98–107)
CO2 SERPL-SCNC: 30 MMOL/L (ref 22–29)
CREAT BLD-MCNC: 0.56 MG/DL (ref 0.57–1)
DEPRECATED RDW RBC AUTO: 42 FL (ref 37–54)
ERYTHROCYTE [DISTWIDTH] IN BLOOD BY AUTOMATED COUNT: 14.4 % (ref 12.3–15.4)
GFR SERPL CREATININE-BSD FRML MDRD: 117 ML/MIN/1.73
GLOBULIN UR ELPH-MCNC: 3.5 GM/DL
GLUCOSE BLD-MCNC: 99 MG/DL (ref 65–99)
HCT VFR BLD AUTO: 39.8 % (ref 34–46.6)
HGB BLD-MCNC: 12.9 G/DL (ref 12–15.9)
INR PPP: 0.98 (ref 0.91–1.09)
MCH RBC QN AUTO: 26.5 PG (ref 26.6–33)
MCHC RBC AUTO-ENTMCNC: 32.4 G/DL (ref 31.5–35.7)
MCV RBC AUTO: 81.7 FL (ref 79–97)
PLATELET # BLD AUTO: 272 10*3/MM3 (ref 140–450)
PMV BLD AUTO: 9.3 FL (ref 6–12)
POTASSIUM BLD-SCNC: 3.7 MMOL/L (ref 3.5–5.2)
PROT SERPL-MCNC: 8 G/DL (ref 6–8.5)
PROTHROMBIN TIME: 13.3 SECONDS (ref 11.9–14.6)
RBC # BLD AUTO: 4.87 10*6/MM3 (ref 3.77–5.28)
SODIUM BLD-SCNC: 140 MMOL/L (ref 136–145)
WBC NRBC COR # BLD: 7.6 10*3/MM3 (ref 3.4–10.8)

## 2019-10-04 PROCEDURE — 80053 COMPREHEN METABOLIC PANEL: CPT | Performed by: SURGERY

## 2019-10-04 PROCEDURE — 85027 COMPLETE CBC AUTOMATED: CPT | Performed by: SURGERY

## 2019-10-04 PROCEDURE — 36415 COLL VENOUS BLD VENIPUNCTURE: CPT

## 2019-10-04 PROCEDURE — 85730 THROMBOPLASTIN TIME PARTIAL: CPT | Performed by: SURGERY

## 2019-10-04 PROCEDURE — 85610 PROTHROMBIN TIME: CPT | Performed by: SURGERY

## 2019-10-04 NOTE — PROGRESS NOTES
Date: 10-04-19    [facility]   Information and Education Class for Pre and Post     Surgery Care, Diets and Daily Living.       Surgery Type: Sleeve Gastrectomy Consent on File    Height: 65in   Weight: 250.6lb  BMI: 41.72    Diet Stages Form given including diet stages and surgery dates/times   Weight Loss Surgery Patient Contract on File      Patient has signed/agreed with the Diet Stage & Medication discontinue sheet:       1) Medications have been review by Dr Hendrickson and there are no other     Medications except for the following that this patient will need to    discontinue prior to Bariatric Surgery.      2) Patient informed to stop NSAID one week prior to surgery.                    If the patient is taking Metformin he/she will need to discontinue                          it two weeks prior to surgery.                   Birth control Medications are to be discontinued two weeks prior     and four week post surgery.                   They have also been instructed not to take Hydrodiuril, Ferosul, Cholecalciferol, Atrovastatin, Multi-Vitamin, Effexor or Claritin                             the following medications the morning of their procedure:         3) Dr Hendrickson/Surgeon recommends that this patient take the following two     hours prior to their arrival time:     A)   2 extra strength Tylenol (1000mg)                           B)    8 ounces (Only) of Sugar Free Gatorade, G2 or Powerade Zero                                   (no red or pink in color)                          C)   RX of Emend as or if Prescribed by Surgeon       4) During Boot camp-Patient also met with Outpatient Surgery Staff regarding    Their other medications & prep for surgery.      Patient also received [facility] Surgery Post Follow up Appointments 1 week- 1 year.     DATE@  12:02 PM

## 2019-10-04 NOTE — DISCHARGE INSTRUCTIONS
DAY OF SURGERY INSTRUCTIONS        YOUR SURGEON: dr kong    PROCEDURE: ***GASTRIC SLEEVE LAPAROSCOPIC    DATE OF SURGERY: ***10/14/2019    ARRIVAL TIME: AS DIRECTED BY OFFICE    YOU MAY TAKE THE FOLLOWING MEDICATION(S) THE MORNING OF SURGERY WITH A SIP OF WATER: ***no meds per anesthesia, follow dr kong instructions for preop for surgery      ALL OTHER HOME MEDICATIONS CHECK WITH YOUR DOCTOR              MANAGING PAIN AFTER SURGERY    We know you are probably wondering what your pain will be like after surgery.  Following surgery it is unrealistic to expect you will not have pain.   Pain is how our bodies let us know that something is wrong or cautions us to be careful.  That said, our goal is to make your pain tolerable.    Methods we may use to treat your pain include (oral or IV medications, PCAs, epidurals, nerve blocks, etc.)   While some procedures require IV pain medications for a short time after surgery, transitioning to pain medications by mouth allows for better management of pain.   Your nurse will encourage you to take oral pain medications whenever possible.  IV medications work almost immediately, but only last a short while.  Taking medications by mouth allows for a more constant level of medication in your blood stream for a longer period of time.      Once your pain is out of control it is harder to get back under control.  It is important you are aware when your next dose of pain medication is due.  If you are admitted, your nurse may write the time of your next dose on the white board in your room to help you remember.      We are interested in your pain and encourage you to inform us about aggravating factors during your visit.   Many times a simple repositioning every few hours can make a big difference.    If your physician says it is okay, do not let your pain prevent you from getting out of bed. Be sure to call your nurse for assistance prior to getting up so you do not fall.      Before  surgery, please decide your tolerable pain goal.  These faces help describe the pain ratings we use on a 0-10 scale.   Be prepared to tell us your goal and whether or not you take pain or anxiety medications at home.        BEFORE YOU COME TO THE HOSPITAL  (Pre-op instructions)  • Do not eat, drink, smoke or chew gum after midnight the night before surgery.  This also includes no mints.  • Morning of surgery take only the medicines you have been instructed with a sip of water unless otherwise instructed  by your physician.  • Do not shave, wear makeup or dark nail polish.  • Remove all jewelry including rings.  • Leave anything you consider valuable at home.  • Leave your suitcase in the car until after your surgery.  • Bring the following with you if applicable:  o Picture ID and insurance, Medicare or Medicaid cards  o Co-pay/deductible required by insurance (cash, check, credit card)  o Copy of advance directive, living will or power-of- documents if not brought to PAT  o CPAP or BIPAP mask and tubing  o Relaxation aids ( book, magazine), etc.  o Hearing aids                                  ON THE DAY OF SURGERY  · On the day of surgery check in at registration located at the main entrance of the hospital.   ? You will be registered and given a beeper with instructions where to wait in the main lobby.  ? When your beeper lights up and vibrates a member of the Outpatient Surgery staff will meet you at the double doors under the stair steps and escort you to your preoperative room.   · You may have cloth compression devices placed on your legs. These help to prevent blood clots and reduce swelling in your legs.  · An IV may be inserted into one of your veins.  · In the operating room, you may be given one or more of the following:  ? A medicine to help you relax (sedative).  ? A medicine to numb the area (local anesthetic).  ? A medicine to make you fall asleep (general anesthetic).  ? A medicine that is  "injected into an area of your body to numb everything below the injection site (regional anesthetic).  · Your surgical site will be marked or identified.  · You may be given an antibiotic through your IV to help prevent infection.  Contact a health care provider if you:  · Develop a fever of more than 100.4°F (38°C) or other feelings of illness during the 48 hours before your surgery.  · Have symptoms that get worse.  Have questions or concerns about your surgery    General Anesthesia/Surgery, Adult  General anesthesia is the use of medicines to make a person \"go to sleep\" (unconscious) for a medical procedure. General anesthesia must be used for certain procedures, and is often recommended for procedures that:  · Last a long time.  · Require you to be still or in an unusual position.  · Are major and can cause blood loss.  The medicines used for general anesthesia are called general anesthetics. As well as making you unconscious for a certain amount of time, these medicines:  · Prevent pain.  · Control your blood pressure.  · Relax your muscles.  Tell a health care provider about:  · Any allergies you have.  · All medicines you are taking, including vitamins, herbs, eye drops, creams, and over-the-counter medicines.  · Any problems you or family members have had with anesthetic medicines.  · Types of anesthetics you have had in the past.  · Any blood disorders you have.  · Any surgeries you have had.  · Any medical conditions you have.  · Any recent upper respiratory, chest, or ear infections.  · Any history of:  ? Heart or lung conditions, such as heart failure, sleep apnea, asthma, or chronic obstructive pulmonary disease (COPD).  ?  service.  ? Depression or anxiety.  · Any tobacco or drug use, including marijuana or alcohol use.  · Whether you are pregnant or may be pregnant.  What are the risks?  Generally, this is a safe procedure. However, problems may occur, including:  · Allergic reaction.  · Lung " and heart problems.  · Inhaling food or liquid from the stomach into the lungs (aspiration).  · Nerve injury.  · Air in the bloodstream, which can lead to stroke.  · Extreme agitation or confusion (delirium) when you wake up from the anesthetic.  · Waking up during your procedure and being unable to move. This is rare.  These problems are more likely to develop if you are having a major surgery or if you have an advanced or serious medical condition. You can prevent some of these complications by answering all of your health care provider's questions thoroughly and by following all instructions before your procedure.  General anesthesia can cause side effects, including:  · Nausea or vomiting.  · A sore throat from the breathing tube.  · Hoarseness.  · Wheezing or coughing.  · Shaking chills.  · Tiredness.  · Body aches.  · Anxiety.  · Sleepiness or drowsiness.  · Confusion or agitation.  RISKS AND COMPLICATIONS OF SURGERY  Your health care provider will discuss possible risks and complications with you before surgery. Common risks and complications include:    · Problems due to the use of anesthetics.  · Blood loss and replacement (does not apply to minor surgical procedures).  · Temporary increase in pain due to surgery.  · Uncorrected pain or problems that the surgery was meant to correct.  · Infection.  · New damage.    What happens before the procedure?    Medicines  Ask your health care provider about:  · Changing or stopping your regular medicines. This is especially important if you are taking diabetes medicines or blood thinners.  · Taking medicines such as aspirin and ibuprofen. These medicines can thin your blood. Do not take these medicines unless your health care provider tells you to take them.  · Taking over-the-counter medicines, vitamins, herbs, and supplements. Do not take these during the week before your procedure unless your health care provider approves them.  General instructions  · Starting  3-6 weeks before the procedure, do not use any products that contain nicotine or tobacco, such as cigarettes and e-cigarettes. If you need help quitting, ask your health care provider.  · If you brush your teeth on the morning of the procedure, make sure to spit out all of the toothpaste.  · Tell your health care provider if you become ill or develop a cold, cough, or fever.  · If instructed by your health care provider, bring your sleep apnea device with you on the day of your surgery (if applicable).  · Ask your health care provider if you will be going home the same day, the following day, or after a longer hospital stay.  ? Plan to have someone take you home from the hospital or clinic.  ? Plan to have a responsible adult care for you for at least 24 hours after you leave the hospital or clinic. This is important.  What happens during the procedure?  · You will be given anesthetics through both of the following:  ? A mask placed over your nose and mouth.  ? An IV in one of your veins.  · You may receive a medicine to help you relax (sedative).  · After you are unconscious, a breathing tube may be inserted down your throat to help you breathe. This will be removed before you wake up.  · An anesthesia specialist will stay with you throughout your procedure. He or she will:  ? Keep you comfortable and safe by continuing to give you medicines and adjusting the amount of medicine that you get.  ? Monitor your blood pressure, pulse, and oxygen levels to make sure that the anesthetics do not cause any problems.  The procedure may vary among health care providers and hospitals.  What happens after the procedure?  · Your blood pressure, temperature, heart rate, breathing rate, and blood oxygen level will be monitored until the medicines you were given have worn off.  · You will wake up in a recovery area. You may wake up slowly.  · If you feel anxious or agitated, you may be given medicine to help you calm down.  · If  you will be going home the same day, your health care provider may check to make sure you can walk, drink, and urinate.  · Your health care provider will treat any pain or side effects you have before you go home.  · Do not drive for 24 hours if you were given a sedative.  Summary  · General anesthesia is used to keep you still and prevent pain during a procedure.  · It is important to tell your healthcare provider about your medical history and any surgeries you have had, and previous experience with anesthesia.  · Follow your healthcare provider’s instructions about when to stop eating, drinking, or taking certain medicines before your procedure.  · Plan to have someone take you home from the hospital or clinic.  This information is not intended to replace advice given to you by your health care provider. Make sure you discuss any questions you have with your health care provider.  Document Released: 03/26/2009 Document Revised: 08/03/2018 Document Reviewed: 08/03/2018  [employer] Interactive Patient Education © 2019 [employer]    Fall Prevention in Hospitals, Adult  As a hospital patient, your condition and the treatments you receive can increase your risk for falls. Some additional risk factors for falls in a hospital include:  · Being in an unfamiliar environment.  · Being on bed rest.  · Your surgery.  · Taking certain medicines.  · Your tubing requirements, such as intravenous (IV) therapy or catheters.  It is important that you learn how to decrease fall risks while at the hospital. Below are important tips that can help prevent falls.  SAFETY TIPS FOR PREVENTING FALLS  Talk about your risk of falling.  · Ask your health care provider why you are at risk for falling. Is it your medicine, illness, tubing placement, or something else?  · Make a plan with your health care provider to keep you safe from falls.  · Ask your health care provider or pharmacist about side effects of your medicines. Some medicines  can make you dizzy or affect your coordination.  Ask for help.  · Ask for help before getting out of bed. You may need to press your call button.  · Ask for assistance in getting safely to the toilet.  · Ask for a walker or cane to be put at your bedside. Ask that most of the side rails on your bed be placed up before your health care provider leaves the room.  · Ask family or friends to sit with you.  · Ask for things that are out of your reach, such as your glasses, hearing aids, telephone, bedside table, or call button.  Follow these tips to avoid falling:  · Stay lying or seated, rather than standing, while waiting for help.  · Wear rubber-soled slippers or shoes whenever you walk in the hospital.  · Avoid quick, sudden movements.  ¨ Change positions slowly.  ¨ Sit on the side of your bed before standing.  ¨ Stand up slowly and wait before you start to walk.  · Let your health care provider know if there is a spill on the floor.  · Pay careful attention to the medical equipment, electrical cords, and tubes around you.  · When you need help, use your call button by your bed or in the bathroom. Wait for one of your health care providers to help you.  · If you feel dizzy or unsure of your footing, return to bed and wait for assistance.  · Avoid being distracted by the TV, telephone, or another person in your room.  · Do not lean or support yourself on rolling objects, such as IV poles or bedside tables.     This information is not intended to replace advice given to you by your health care provider. Make sure you discuss any questions you have with your health care provider.     Document Released: 12/15/2001 Document Revised: 01/08/2016 Document Reviewed: 08/25/2013  [employer] Interactive Patient Education ©2016 [employer]   .    Surgical Site Infections FAQs  What is a Surgical Site Infection (SSI)?  A surgical site infection is an infection that occurs after surgery in the part of the body where the surgery  took place. Most patients who have surgery do not develop an infection. However, infections develop in about 1 to 3 out of every 100 patients who have surgery.  Some of the common symptoms of a surgical site infection are:  · Redness and pain around the area where you had surgery  · Drainage of cloudy fluid from your surgical wound  · Fever  Can SSIs be treated?  Yes. Most surgical site infections can be treated with antibiotics. The antibiotic given to you depends on the bacteria (germs) causing the infection. Sometimes patients with SSIs also need another surgery to treat the infection.  What are some of the things that hospitals are doing to prevent SSIs?  To prevent SSIs, doctors, nurses, and other healthcare providers:  · Clean their hands and arms up to their elbows with an antiseptic agent just before the surgery.  · Clean their hands with soap and water or an alcohol-based hand rub before and after caring for each patient.  · May remove some of your hair immediately before your surgery using electric clippers if the hair is in the same area where the procedure will occur. They should not shave you with a razor.  · Wear special hair covers, masks, gowns, and gloves during surgery to keep the surgery area clean.  · Give you antibiotics before your surgery starts. In most cases, you should get antibiotics within 60 minutes before the surgery starts and the antibiotics should be stopped within 24 hours after surgery.  · Clean the skin at the site of your surgery with a special soap that kills germs.  What can I do to help prevent SSIs?  Before your surgery:  · Tell your doctor about other medical problems you may have. Health problems such as allergies, diabetes, and obesity could affect your surgery and your treatment.  · Quit smoking. Patients who smoke get more infections. Talk to your doctor about how you can quit before your surgery.  · Do not shave near where you will have surgery. Shaving with a razor can  irritate your skin and make it easier to develop an infection.  At the time of your surgery:  · Speak up if someone tries to shave you with a razor before surgery. Ask why you need to be shaved and talk with your surgeon if you have any concerns.  · Ask if you will get antibiotics before surgery.  After your surgery:  · Make sure that your healthcare providers clean their hands before examining you, either with soap and water or an alcohol-based hand rub.  · If you do not see your providers clean their hands, please ask them to do so.  · Family and friends who visit you should not touch the surgical wound or dressings.  · Family and friends should clean their hands with soap and water or an alcohol-based hand rub before and after visiting you. If you do not see them clean their hands, ask them to clean their hands.  What do I need to do when I go home from the hospital?  · Before you go home, your doctor or nurse should explain everything you need to know about taking care of your wound. Make sure you understand how to care for your wound before you leave the hospital.  · Always clean your hands before and after caring for your wound.  · Before you go home, make sure you know who to contact if you have questions or problems after you get home.  · If you have any symptoms of an infection, such as redness and pain at the surgery site, drainage, or fever, call your doctor immediately.  If you have additional questions, please ask your doctor or nurse.  Developed and co-sponsored by The Society for Healthcare Epidemiology of [place] (SHEA); Infectious Diseases Society of [place] (IDSA); American Hospital Association; Association for Professionals in Infection Control and Epidemiology (APIC); Centers for Disease Control and Prevention (CDC); and The Joint Commission.     This information is not intended to replace advice given to you by your health care provider. Make sure you discuss any questions you have with your  health care provider.     Document Released: 12/23/2014 Document Revised: 01/08/2016 Document Reviewed: 03/02/2016  [employer] Interactive Patient Education ©2016 [employer]         [facility]  CHG 4% Patient Instruction Sheet    Chlorhexidine Before Surgery  Chlorhexidine gluconate (CHG) is a germ-killing (antiseptic) solution that is used to clean the skin. It gets rid of the bacteria that normally live on the skin. Cleaning your skin with CHG before surgery helps lower the risk for infection after surgery.     How to use CHG solution  · You will take 2 showers, one shower the night before surgery, the second shower the morning of surgery before coming to the hospital.  · Use CHG only as told by your health care provider, and follow the instructions on the label.  · Use CHG solution while taking a shower. Follow these steps when using CHG solution (unless your health care provider gives you different instructions):  1. Start the shower.  2. Use your normal soap and shampoo to wash your face and hair.  3. Turn off the shower or move out of the shower stream.  4. Pour the CHG onto a clean washcloth. Do not use any type of brush or rough-edged sponge.  5. Starting at your neck, lather your body down to your toes. Make sure you:  6. Pay special attention to the part of your body where you will be having surgery. Scrub this area for at least 1 minute.  7. Use the full amount of CHG as directed. Usually, this is one half bottle for each shower.  8. Do not use CHG on your head or face. If the solution gets into your ears or eyes, rinse them well with water.  9. Avoid your genital area.  10. Avoid any areas of skin that have broken skin, cuts, or scrapes.  11. Scrub your back and under your arms. Make sure to wash skin folds.  12. Let the lather sit on your skin for 1-2 minutes or as long as told by your health care provider.  13. Thoroughly rinse your entire body in the shower. Make sure that all body  creases and crevices are rinsed well.  14. Dry off with a clean towel. Do not put any substances on your body afterward, such as powder, lotion, or perfume.  15. Put on clean clothes or pajamas.  16. If it is the night before your surgery, sleep in clean sheets.    What are the risks?  Risks of using CHG include:  · A skin reaction.  · Hearing loss, if CHG gets in your ears.  · Eye injury, if CHG gets in your eyes and is not rinsed out.  · The CHG product catching fire.  Make sure that you avoid smoking and flames after applying CHG to your skin.  Do not use CHG:  · If you have a chlorhexidine allergy or have previously reacted to chlorhexidine.  · On babies younger than 2 months of age.      On the day of surgery, when you are taken to your room in Outpatient Surgery you will be given a CHG prepackaged cloth to wipe the site for your surgery.  How to use CHG prepackaged cloths  · Follow the instructions on the label.  · Use the CHG cloth on clean, dry skin. Follow these steps when using a CHG cloth(unless your health care provider gives you different instructions):  1. Using the CHG cloth, vigorously scrub the part of your body where you will be having surgery. Scrub using a back-and-forth motion for 3 minutes. The area on your body should be completely wet with CHG when you are finished scrubbing.  2. Do not rinse. Discard the cloth and let the area air-dry for 1 minute. Do not put any substances on your body afterward, such as powder, lotion, or perfume.  Contact a health care provider if:  · Your skin gets irritated after scrubbing.  · You have questions about using your solution or cloth.  Get help right away if:  · Your eyes become very red or swollen.  · Your eyes itch badly.  · Your skin itches badly and is red or swollen.  · Your hearing changes.  · You have trouble seeing.  · You have swelling or tingling in your mouth or throat.  · You have trouble breathing.  · You swallow any  chlorhexidine.  Summary  · Chlorhexidine gluconate (CHG) is a germ-killing (antiseptic) solution that is used to clean the skin. Cleaning your skin with CHG before surgery helps lower the risk for infection after surgery.  · You may be given CHG to use at home. It may be in a bottle or in a prepackaged cloth to use on your skin. Carefully follow your health care provider's instructions and the instructions on the product label.  · Do not use CHG if you have a chlorhexidine allergy.  · Contact your health care provider if your skin gets irritated after scrubbing.  This information is not intended to replace advice given to you by your health care provider. Make sure you discuss any questions you have with your health care provider.  Document Released: 09/11/2013 Document Revised: 11/15/2018 Document Reviewed: 11/15/2018  [employer] Interactive Patient Education © 2019 [employer]        DAY OF SURGERY INSTRUCTIONS        YOUR SURGEON: dr kong    PROCEDURE: ***GASTRIC SLEEVE LAPAROSCOPIC    DATE OF SURGERY: ***10/14/2019  ARRIVAL TIME: AS DIRECTED BY OFFICE    YOU MAY TAKE THE FOLLOWING MEDICATION(S) THE MORNING OF SURGERY WITH A SIP OF WATER: ***      ALL OTHER HOME MEDICATION CHECK WITH YOUR PHYSICIAN                MANAGING PAIN AFTER SURGERY    We know you are probably wondering what your pain will be like after surgery.  Following surgery it is unrealistic to expect you will not have pain.   Pain is how our bodies let us know that something is wrong or cautions us to be careful.  That said, our goal is to make your pain tolerable.    Methods we may use to treat your pain include (oral or IV medications, PCAs, epidurals, nerve blocks, etc.)   While some procedures require IV pain medications for a short time after surgery, transitioning to pain medications by mouth allows for better management of pain.   Your nurse will encourage you to take oral pain medications whenever possible.  IV medications work almost  immediately, but only last a short while.  Taking medications by mouth allows for a more constant level of medication in your blood stream for a longer period of time.      Once your pain is out of control it is harder to get back under control.  It is important you are aware when your next dose of pain medication is due.  If you are admitted, your nurse may write the time of your next dose on the white board in your room to help you remember.      We are interested in your pain and encourage you to inform us about aggravating factors during your visit.   Many times a simple repositioning every few hours can make a big difference.    If your physician says it is okay, do not let your pain prevent you from getting out of bed. Be sure to call your nurse for assistance prior to getting up so you do not fall.      Before surgery, please decide your tolerable pain goal.  These faces help describe the pain ratings we use on a 0-10 scale.   Be prepared to tell us your goal and whether or not you take pain or anxiety medications at home.          BEFORE YOU COME TO THE HOSPITAL  (Pre-op instructions)  • Do not eat, drink, smoke or chew gum after midnight the night before surgery.  This also includes no mints.  • Morning of surgery take only the medicines you have been instructed with a sip of water unless otherwise instructed  by your physician.  • Do not shave, wear makeup or dark nail polish.  • Remove all jewelry including rings.  • Leave anything you consider valuable at home.  • Leave your suitcase in the car until after your surgery.  • Bring the following with you if applicable:  o Picture ID and insurance, Medicare or Medicaid cards  o Co-pay/deductible required by insurance (cash, check, credit card)  o Copy of advance directive, living will or power-of- documents if not brought to PAT  o CPAP or BIPAP mask and tubing  o Relaxation aids ( book, magazine), etc.  o Hearing aids                        ON THE  "DAY OF SURGERY  · On the day of surgery check in at registration located at the main entrance of the hospital.   ? You will be registered and given a beeper with instructions where to wait in the main lobby.  ? When your beeper lights up and vibrates a member of the Outpatient Surgery staff will meet you at the double doors under the stair steps and escort you to your preoperative room.   · You may have cloth compression devices placed on your legs. These help to prevent blood clots and reduce swelling in your legs.  · An IV may be inserted into one of your veins.  · In the operating room, you may be given one or more of the following:  ? A medicine to help you relax (sedative).  ? A medicine to numb the area (local anesthetic).  ? A medicine to make you fall asleep (general anesthetic).  ? A medicine that is injected into an area of your body to numb everything below the injection site (regional anesthetic).  · Your surgical site will be marked or identified.  · You may be given an antibiotic through your IV to help prevent infection.  Contact a health care provider if you:  · Develop a fever of more than 100.4°F (38°C) or other feelings of illness during the 48 hours before your surgery.  · Have symptoms that get worse.  Have questions or concerns about your surgery    General Anesthesia/Surgery, Adult  General anesthesia is the use of medicines to make a person \"go to sleep\" (unconscious) for a medical procedure. General anesthesia must be used for certain procedures, and is often recommended for procedures that:  · Last a long time.  · Require you to be still or in an unusual position.  · Are major and can cause blood loss.  The medicines used for general anesthesia are called general anesthetics. As well as making you unconscious for a certain amount of time, these medicines:  · Prevent pain.  · Control your blood pressure.  · Relax your muscles.  Tell a health care provider about:  · Any allergies you " have.  · All medicines you are taking, including vitamins, herbs, eye drops, creams, and over-the-counter medicines.  · Any problems you or family members have had with anesthetic medicines.  · Types of anesthetics you have had in the past.  · Any blood disorders you have.  · Any surgeries you have had.  · Any medical conditions you have.  · Any recent upper respiratory, chest, or ear infections.  · Any history of:  ? Heart or lung conditions, such as heart failure, sleep apnea, asthma, or chronic obstructive pulmonary disease (COPD).  ?  service.  ? Depression or anxiety.  · Any tobacco or drug use, including marijuana or alcohol use.  · Whether you are pregnant or may be pregnant.  What are the risks?  Generally, this is a safe procedure. However, problems may occur, including:  · Allergic reaction.  · Lung and heart problems.  · Inhaling food or liquid from the stomach into the lungs (aspiration).  · Nerve injury.  · Air in the bloodstream, which can lead to stroke.  · Extreme agitation or confusion (delirium) when you wake up from the anesthetic.  · Waking up during your procedure and being unable to move. This is rare.  These problems are more likely to develop if you are having a major surgery or if you have an advanced or serious medical condition. You can prevent some of these complications by answering all of your health care provider's questions thoroughly and by following all instructions before your procedure.  General anesthesia can cause side effects, including:  · Nausea or vomiting.  · A sore throat from the breathing tube.  · Hoarseness.  · Wheezing or coughing.  · Shaking chills.  · Tiredness.  · Body aches.  · Anxiety.  · Sleepiness or drowsiness.  · Confusion or agitation.  RISKS AND COMPLICATIONS OF SURGERY  Your health care provider will discuss possible risks and complications with you before surgery. Common risks and complications include:    · Problems due to the use of  anesthetics.  · Blood loss and replacement (does not apply to minor surgical procedures).  · Temporary increase in pain due to surgery.  · Uncorrected pain or problems that the surgery was meant to correct.  · Infection.  · New damage.    What happens before the procedure?    Medicines  Ask your health care provider about:  · Changing or stopping your regular medicines. This is especially important if you are taking diabetes medicines or blood thinners.  · Taking medicines such as aspirin and ibuprofen. These medicines can thin your blood. Do not take these medicines unless your health care provider tells you to take them.  · Taking over-the-counter medicines, vitamins, herbs, and supplements. Do not take these during the week before your procedure unless your health care provider approves them.  General instructions  · Starting 3-6 weeks before the procedure, do not use any products that contain nicotine or tobacco, such as cigarettes and e-cigarettes. If you need help quitting, ask your health care provider.  · If you brush your teeth on the morning of the procedure, make sure to spit out all of the toothpaste.  · Tell your health care provider if you become ill or develop a cold, cough, or fever.  · If instructed by your health care provider, bring your sleep apnea device with you on the day of your surgery (if applicable).  · Ask your health care provider if you will be going home the same day, the following day, or after a longer hospital stay.  ? Plan to have someone take you home from the hospital or clinic.  ? Plan to have a responsible adult care for you for at least 24 hours after you leave the hospital or clinic. This is important.  What happens during the procedure?  · You will be given anesthetics through both of the following:  ? A mask placed over your nose and mouth.  ? An IV in one of your veins.  · You may receive a medicine to help you relax (sedative).  · After you are unconscious, a breathing  tube may be inserted down your throat to help you breathe. This will be removed before you wake up.  · An anesthesia specialist will stay with you throughout your procedure. He or she will:  ? Keep you comfortable and safe by continuing to give you medicines and adjusting the amount of medicine that you get.  ? Monitor your blood pressure, pulse, and oxygen levels to make sure that the anesthetics do not cause any problems.  The procedure may vary among health care providers and hospitals.  What happens after the procedure?  · Your blood pressure, temperature, heart rate, breathing rate, and blood oxygen level will be monitored until the medicines you were given have worn off.  · You will wake up in a recovery area. You may wake up slowly.  · If you feel anxious or agitated, you may be given medicine to help you calm down.  · If you will be going home the same day, your health care provider may check to make sure you can walk, drink, and urinate.  · Your health care provider will treat any pain or side effects you have before you go home.  · Do not drive for 24 hours if you were given a sedative.  Summary  · General anesthesia is used to keep you still and prevent pain during a procedure.  · It is important to tell your healthcare provider about your medical history and any surgeries you have had, and previous experience with anesthesia.  · Follow your healthcare provider’s instructions about when to stop eating, drinking, or taking certain medicines before your procedure.  · Plan to have someone take you home from the hospital or clinic.  This information is not intended to replace advice given to you by your health care provider. Make sure you discuss any questions you have with your health care provider.  Document Released: 03/26/2009 Document Revised: 08/03/2018 Document Reviewed: 08/03/2018  [employer] Interactive Patient Education © 2019 [employer]       Fall Prevention in Hospitals, Adult  As a hospital  patient, your condition and the treatments you receive can increase your risk for falls. Some additional risk factors for falls in a hospital include:  · Being in an unfamiliar environment.  · Being on bed rest.  · Your surgery.  · Taking certain medicines.  · Your tubing requirements, such as intravenous (IV) therapy or catheters.  It is important that you learn how to decrease fall risks while at the hospital. Below are important tips that can help prevent falls.  SAFETY TIPS FOR PREVENTING FALLS  Talk about your risk of falling.  · Ask your health care provider why you are at risk for falling. Is it your medicine, illness, tubing placement, or something else?  · Make a plan with your health care provider to keep you safe from falls.  · Ask your health care provider or pharmacist about side effects of your medicines. Some medicines can make you dizzy or affect your coordination.  Ask for help.  · Ask for help before getting out of bed. You may need to press your call button.  · Ask for assistance in getting safely to the toilet.  · Ask for a walker or cane to be put at your bedside. Ask that most of the side rails on your bed be placed up before your health care provider leaves the room.  · Ask family or friends to sit with you.  · Ask for things that are out of your reach, such as your glasses, hearing aids, telephone, bedside table, or call button.  Follow these tips to avoid falling:  · Stay lying or seated, rather than standing, while waiting for help.  · Wear rubber-soled slippers or shoes whenever you walk in the hospital.  · Avoid quick, sudden movements.  ¨ Change positions slowly.  ¨ Sit on the side of your bed before standing.  ¨ Stand up slowly and wait before you start to walk.  · Let your health care provider know if there is a spill on the floor.  · Pay careful attention to the medical equipment, electrical cords, and tubes around you.  · When you need help, use your call button by your bed or in the  bathroom. Wait for one of your health care providers to help you.  · If you feel dizzy or unsure of your footing, return to bed and wait for assistance.  · Avoid being distracted by the TV, telephone, or another person in your room.  · Do not lean or support yourself on rolling objects, such as IV poles or bedside tables.     This information is not intended to replace advice given to you by your health care provider. Make sure you discuss any questions you have with your health care provider.     Document Released: 12/15/2001 Document Revised: 01/08/2016 Document Reviewed: 08/25/2013  [employer] Interactive Patient Education ©2016 [employer]       Surgical Site Infections FAQs  What is a Surgical Site Infection (SSI)?  A surgical site infection is an infection that occurs after surgery in the part of the body where the surgery took place. Most patients who have surgery do not develop an infection. However, infections develop in about 1 to 3 out of every 100 patients who have surgery.  Some of the common symptoms of a surgical site infection are:  · Redness and pain around the area where you had surgery  · Drainage of cloudy fluid from your surgical wound  · Fever  Can SSIs be treated?  Yes. Most surgical site infections can be treated with antibiotics. The antibiotic given to you depends on the bacteria (germs) causing the infection. Sometimes patients with SSIs also need another surgery to treat the infection.  What are some of the things that hospitals are doing to prevent SSIs?  To prevent SSIs, doctors, nurses, and other healthcare providers:  · Clean their hands and arms up to their elbows with an antiseptic agent just before the surgery.  · Clean their hands with soap and water or an alcohol-based hand rub before and after caring for each patient.  · May remove some of your hair immediately before your surgery using electric clippers if the hair is in the same area where the procedure will occur. They should  not shave you with a razor.  · Wear special hair covers, masks, gowns, and gloves during surgery to keep the surgery area clean.  · Give you antibiotics before your surgery starts. In most cases, you should get antibiotics within 60 minutes before the surgery starts and the antibiotics should be stopped within 24 hours after surgery.  · Clean the skin at the site of your surgery with a special soap that kills germs.  What can I do to help prevent SSIs?  Before your surgery:  · Tell your doctor about other medical problems you may have. Health problems such as allergies, diabetes, and obesity could affect your surgery and your treatment.  · Quit smoking. Patients who smoke get more infections. Talk to your doctor about how you can quit before your surgery.  · Do not shave near where you will have surgery. Shaving with a razor can irritate your skin and make it easier to develop an infection.  At the time of your surgery:  · Speak up if someone tries to shave you with a razor before surgery. Ask why you need to be shaved and talk with your surgeon if you have any concerns.  · Ask if you will get antibiotics before surgery.  After your surgery:  · Make sure that your healthcare providers clean their hands before examining you, either with soap and water or an alcohol-based hand rub.  · If you do not see your providers clean their hands, please ask them to do so.  · Family and friends who visit you should not touch the surgical wound or dressings.  · Family and friends should clean their hands with soap and water or an alcohol-based hand rub before and after visiting you. If you do not see them clean their hands, ask them to clean their hands.  What do I need to do when I go home from the hospital?  · Before you go home, your doctor or nurse should explain everything you need to know about taking care of your wound. Make sure you understand how to care for your wound before you leave the hospital.  · Always clean your  hands before and after caring for your wound.  · Before you go home, make sure you know who to contact if you have questions or problems after you get home.  · If you have any symptoms of an infection, such as redness and pain at the surgery site, drainage, or fever, call your doctor immediately.  If you have additional questions, please ask your doctor or nurse.  Developed and co-sponsored by The Society for Healthcare Epidemiology of [place] (SHEA); Infectious Diseases Society of [place] (IDSA); American Hospital Association; Association for Professionals in Infection Control and Epidemiology (APIC); Centers for Disease Control and Prevention (CDC); and The Joint Commission.     This information is not intended to replace advice given to you by your health care provider. Make sure you discuss any questions you have with your health care provider.     Document Released: 12/23/2014 Document Revised: 01/08/2016 Document Reviewed: 03/02/2016  [employer] Interactive Patient Education ©2016 [employer]           [facility]  CHG 4% Patient Instruction Sheet    Chlorhexidine Before Surgery  Chlorhexidine gluconate (CHG) is a germ-killing (antiseptic) solution that is used to clean the skin. It gets rid of the bacteria that normally live on the skin. Cleaning your skin with CHG before surgery helps lower the risk for infection after surgery.    How to use CHG solution  · You will take 2 showers, one shower the night before surgery, the second shower the morning of surgery before coming to the hospital.  · Use CHG only as told by your health care provider, and follow the instructions on the label.  · Use CHG solution while taking a shower. Follow these steps when using CHG solution (unless your health care provider gives you different instructions):  1. Start the shower.  2. Use your normal soap and shampoo to wash your face and hair.  3. Turn off the shower or move out of the shower stream.  4. Pour the CHG  onto a clean washcloth. Do not use any type of brush or rough-edged sponge.  5. Starting at your neck, lather your body down to your toes. Make sure you:  6. Pay special attention to the part of your body where you will be having surgery. Scrub this area for at least 1 minute.  7. Use the full amount of CHG as directed. Usually, this is one half bottle for each shower.  8. Do not use CHG on your head or face. If the solution gets into your ears or eyes, rinse them well with water.  9. Avoid your genital area.  10. Avoid any areas of skin that have broken skin, cuts, or scrapes.  11. Scrub your back and under your arms. Make sure to wash skin folds.  12. Let the lather sit on your skin for 1-2 minutes or as long as told by your health care  provider.  13. Thoroughly rinse your entire body in the shower. Make sure that all body creases and crevices are rinsed well.  14. Dry off with a clean towel. Do not put any substances on your body afterward, such as powder, lotion, or perfume.  15. Put on clean clothes or pajamas.  16. If it is the night before your surgery, sleep in clean sheets.    What are the risks?  Risks of using CHG include:  · A skin reaction.  · Hearing loss, if CHG gets in your ears.  · Eye injury, if CHG gets in your eyes and is not rinsed out.  · The CHG product catching fire.  Make sure that you avoid smoking and flames after applying CHG to your skin.  Do not use CHG:  · If you have a chlorhexidine allergy or have previously reacted to chlorhexidine.  · On babies younger than 2 months of age.      On the day of surgery, when you are taken to your room in Outpatient Surgery you will be given a CHG prepackaged cloth to wipe the site for your surgery.  How to use CHG prepackaged cloths  · Follow the instructions on the label.  · Use the CHG cloth on clean, dry skin. Follow these steps when using a CHG cloth (unless your health care provider gives you different instructions):  1. Using the CHG cloth,  vigorously scrub the part of your body where you will be having surgery. Scrub using a back-and-forth motion for 3 minutes. The area on your body should be completely wet with CHG when you are finished scrubbing.  2. Do not rinse. Discard the cloth and let the area air-dry for 1 minute. Do not put any substances on your body afterward, such as powder, lotion, or perfume.  Contact a health care provider if:  · Your skin gets irritated after scrubbing.  · You have questions about using your solution or cloth.  Get help right away if:  · Your eyes become very red or swollen.  · Your eyes itch badly.  · Your skin itches badly and is red or swollen.  · Your hearing changes.  · You have trouble seeing.  · You have swelling or tingling in your mouth or throat.  · You have trouble breathing.  · You swallow any chlorhexidine.  Summary  · Chlorhexidine gluconate (CHG) is a germ-killing (antiseptic) solution that is used to clean the skin. Cleaning your skin with CHG before surgery helps lower the risk for infection after surgery.  · You may be given CHG to use at home. It may be in a bottle or in a prepackaged cloth to use on your skin. Carefully follow your health care provider's instructions and the instructions on the product label.  · Do not use CHG if you have a chlorhexidine allergy.  · Contact your health care provider if your skin gets irritated after scrubbing.  This information is not intended to replace advice given to you by your health care provider. Make sure you discuss any questions you have with your health care provider.  Document Released: 09/11/2013 Document Revised: 11/15/2018 Document Reviewed: 11/15/2018  [employer] Interactive Patient Education © 2019 [employer]          PATIENT/FAMILY/RESPONSIBLE PARTY VERBALIZES UNDERSTANDING OF ABOVE EDUCATION.  COPY OF PAIN SCALE GIVEN AND REVIEWED WITH VERBALIZED UNDERSTANDING.

## 2019-10-14 ENCOUNTER — ANESTHESIA (OUTPATIENT)
Dept: PERIOP | Facility: HOSPITAL | Age: 44
End: 2019-10-14

## 2019-10-14 ENCOUNTER — HOSPITAL ENCOUNTER (OUTPATIENT)
Facility: HOSPITAL | Age: 44
Discharge: HOME | End: 2019-10-15
Attending: SURGERY | Admitting: SURGERY

## 2019-10-14 ENCOUNTER — ANESTHESIA EVENT (OUTPATIENT)
Dept: PERIOP | Facility: HOSPITAL | Age: 44
End: 2019-10-14

## 2019-10-14 DIAGNOSIS — E66.01 CLASS 3 SEVERE OBESITY DUE TO EXCESS CALORIES WITH SERIOUS COMORBIDITY AND BODY MASS INDEX (BMI) OF 45.0 TO 49.9 IN ADULT (HCC): ICD-10-CM

## 2019-10-14 PROBLEM — E66.813 CLASS 3 SEVERE OBESITY DUE TO EXCESS CALORIES WITH BODY MASS INDEX (BMI) OF 45.0 TO 49.9 IN ADULT: Status: ACTIVE | Noted: 2019-10-14

## 2019-10-14 PROBLEM — Z98.84 STATUS POST LAPAROSCOPIC SLEEVE GASTRECTOMY: Status: ACTIVE | Noted: 2019-10-14

## 2019-10-14 LAB
ABO GROUP BLD: NORMAL
B-HCG UR QL: NEGATIVE
BLD GP AB SCN SERPL QL: NEGATIVE
GLUCOSE BLDC GLUCOMTR-MCNC: 126 MG/DL (ref 70–130)
GLUCOSE BLDC GLUCOMTR-MCNC: 153 MG/DL (ref 70–130)
GLUCOSE BLDC GLUCOMTR-MCNC: 155 MG/DL (ref 70–130)
GLUCOSE BLDC GLUCOMTR-MCNC: 156 MG/DL (ref 70–130)
GLUCOSE BLDC GLUCOMTR-MCNC: 167 MG/DL (ref 70–130)
RH BLD: POSITIVE
T&S EXPIRATION DATE: NORMAL

## 2019-10-14 PROCEDURE — 25010000002 ONDANSETRON PER 1 MG: Performed by: NURSE ANESTHETIST, CERTIFIED REGISTERED

## 2019-10-14 PROCEDURE — 81025 URINE PREGNANCY TEST: CPT | Performed by: SURGERY

## 2019-10-14 PROCEDURE — 63710000001 INSULIN LISPRO (HUMAN) PER 5 UNITS: Performed by: SURGERY

## 2019-10-14 PROCEDURE — 25010000003 LIDOCAINE 1 % SOLUTION: Performed by: SURGERY

## 2019-10-14 PROCEDURE — 25010000002 ONDANSETRON PER 1 MG: Performed by: SURGERY

## 2019-10-14 PROCEDURE — 82962 GLUCOSE BLOOD TEST: CPT

## 2019-10-14 PROCEDURE — 25010000002 FENTANYL CITRATE (PF) 100 MCG/2ML SOLUTION: Performed by: ANESTHESIOLOGY

## 2019-10-14 PROCEDURE — 25010000002 KETOROLAC TROMETHAMINE PER 15 MG: Performed by: NURSE ANESTHETIST, CERTIFIED REGISTERED

## 2019-10-14 PROCEDURE — 25010000002 ONDANSETRON PER 1 MG: Performed by: ANESTHESIOLOGY

## 2019-10-14 PROCEDURE — 86900 BLOOD TYPING SEROLOGIC ABO: CPT | Performed by: SURGERY

## 2019-10-14 PROCEDURE — 94799 UNLISTED PULMONARY SVC/PX: CPT

## 2019-10-14 PROCEDURE — 86901 BLOOD TYPING SEROLOGIC RH(D): CPT | Performed by: SURGERY

## 2019-10-14 PROCEDURE — 25010000002 DEXAMETHASONE PER 1 MG: Performed by: ANESTHESIOLOGY

## 2019-10-14 PROCEDURE — 25010000002 CEFAZOLIN PER 500 MG: Performed by: SURGERY

## 2019-10-14 PROCEDURE — 94760 N-INVAS EAR/PLS OXIMETRY 1: CPT

## 2019-10-14 PROCEDURE — 25010000002 ENOXAPARIN PER 10 MG: Performed by: SURGERY

## 2019-10-14 PROCEDURE — 25010000002 FENTANYL CITRATE (PF) 250 MCG/5ML SOLUTION: Performed by: NURSE ANESTHETIST, CERTIFIED REGISTERED

## 2019-10-14 PROCEDURE — 86850 RBC ANTIBODY SCREEN: CPT | Performed by: SURGERY

## 2019-10-14 PROCEDURE — 25010000002 DEXAMETHASONE PER 1 MG: Performed by: NURSE ANESTHETIST, CERTIFIED REGISTERED

## 2019-10-14 PROCEDURE — 25010000002 SUCCINYLCHOLINE PER 20 MG: Performed by: NURSE ANESTHETIST, CERTIFIED REGISTERED

## 2019-10-14 PROCEDURE — 25010000002 KETOROLAC TROMETHAMINE PER 15 MG: Performed by: SURGERY

## 2019-10-14 PROCEDURE — C1781 MESH (IMPLANTABLE): HCPCS | Performed by: SURGERY

## 2019-10-14 PROCEDURE — 25010000002 PROPOFOL 10 MG/ML EMULSION: Performed by: NURSE ANESTHETIST, CERTIFIED REGISTERED

## 2019-10-14 PROCEDURE — 43775 LAP SLEEVE GASTRECTOMY: CPT | Performed by: SURGERY

## 2019-10-14 PROCEDURE — 25010000002 METOCLOPRAMIDE PER 10 MG: Performed by: SURGERY

## 2019-10-14 PROCEDURE — 88307 TISSUE EXAM BY PATHOLOGIST: CPT | Performed by: SURGERY

## 2019-10-14 PROCEDURE — 25010000002 MIDAZOLAM PER 1 MG: Performed by: ANESTHESIOLOGY

## 2019-10-14 DEVICE — ENDOPATH ECHELON ENDOSCOPIC LINEAR CUTTER RELOADS, GREEN, 60MM
Type: IMPLANTABLE DEVICE | Status: FUNCTIONAL
Brand: ECHELON ENDOPATH

## 2019-10-14 DEVICE — ENDOPATH ECHELON ENDOSCOPIC LINEAR CUTTER RELOADS, BLACK, 60MM
Type: IMPLANTABLE DEVICE | Status: FUNCTIONAL
Brand: ECHELON ENDOPATH

## 2019-10-14 DEVICE — MESH STPL LN SEAMGUARD FLX60 BIOABS WHT/BLU/GRN/GLD/BLK: Type: IMPLANTABLE DEVICE | Status: FUNCTIONAL

## 2019-10-14 DEVICE — SEALANT WND FIBRIN TISSEEL PREFIL/SYR/PRIMAFZ 4ML: Type: IMPLANTABLE DEVICE | Status: FUNCTIONAL

## 2019-10-14 RX ORDER — DEXAMETHASONE SODIUM PHOSPHATE 4 MG/ML
4 INJECTION, SOLUTION INTRA-ARTICULAR; INTRALESIONAL; INTRAMUSCULAR; INTRAVENOUS; SOFT TISSUE ONCE AS NEEDED
Status: COMPLETED | OUTPATIENT
Start: 2019-10-14 | End: 2019-10-14

## 2019-10-14 RX ORDER — SCOLOPAMINE TRANSDERMAL SYSTEM 1 MG/1
1 PATCH, EXTENDED RELEASE TRANSDERMAL ONCE
Status: DISCONTINUED | OUTPATIENT
Start: 2019-10-14 | End: 2019-10-15 | Stop reason: HOSPADM

## 2019-10-14 RX ORDER — FENTANYL CITRATE 50 UG/ML
INJECTION, SOLUTION INTRAMUSCULAR; INTRAVENOUS AS NEEDED
Status: DISCONTINUED | OUTPATIENT
Start: 2019-10-14 | End: 2019-10-14 | Stop reason: SURG

## 2019-10-14 RX ORDER — SODIUM CHLORIDE, SODIUM LACTATE, POTASSIUM CHLORIDE, CALCIUM CHLORIDE 600; 310; 30; 20 MG/100ML; MG/100ML; MG/100ML; MG/100ML
1000 INJECTION, SOLUTION INTRAVENOUS CONTINUOUS
Status: DISCONTINUED | OUTPATIENT
Start: 2019-10-14 | End: 2019-10-15

## 2019-10-14 RX ORDER — SIMETHICONE 80 MG
40 TABLET,CHEWABLE ORAL 4 TIMES DAILY PRN
Status: DISCONTINUED | OUTPATIENT
Start: 2019-10-14 | End: 2019-10-15 | Stop reason: HOSPADM

## 2019-10-14 RX ORDER — SODIUM CHLORIDE 0.9 % (FLUSH) 0.9 %
3-10 SYRINGE (ML) INJECTION AS NEEDED
Status: DISCONTINUED | OUTPATIENT
Start: 2019-10-14 | End: 2019-10-14 | Stop reason: HOSPADM

## 2019-10-14 RX ORDER — FENTANYL CITRATE 50 UG/ML
25 INJECTION, SOLUTION INTRAMUSCULAR; INTRAVENOUS AS NEEDED
Status: DISCONTINUED | OUTPATIENT
Start: 2019-10-14 | End: 2019-10-14 | Stop reason: HOSPADM

## 2019-10-14 RX ORDER — DEXAMETHASONE SODIUM PHOSPHATE 4 MG/ML
4 INJECTION, SOLUTION INTRA-ARTICULAR; INTRALESIONAL; INTRAMUSCULAR; INTRAVENOUS; SOFT TISSUE EVERY 8 HOURS PRN
Status: DISCONTINUED | OUTPATIENT
Start: 2019-10-14 | End: 2019-10-15 | Stop reason: HOSPADM

## 2019-10-14 RX ORDER — ONDANSETRON 2 MG/ML
4 INJECTION INTRAMUSCULAR; INTRAVENOUS EVERY 6 HOURS
Status: DISCONTINUED | OUTPATIENT
Start: 2019-10-14 | End: 2019-10-15 | Stop reason: HOSPADM

## 2019-10-14 RX ORDER — HYDRALAZINE HYDROCHLORIDE 20 MG/ML
10 INJECTION INTRAMUSCULAR; INTRAVENOUS EVERY 4 HOURS PRN
Status: DISCONTINUED | OUTPATIENT
Start: 2019-10-14 | End: 2019-10-15 | Stop reason: HOSPADM

## 2019-10-14 RX ORDER — FAMOTIDINE 10 MG/ML
20 INJECTION, SOLUTION INTRAVENOUS EVERY 12 HOURS SCHEDULED
Status: DISCONTINUED | OUTPATIENT
Start: 2019-10-14 | End: 2019-10-15 | Stop reason: HOSPADM

## 2019-10-14 RX ORDER — SODIUM CHLORIDE 0.9 % (FLUSH) 0.9 %
1-10 SYRINGE (ML) INJECTION AS NEEDED
Status: DISCONTINUED | OUTPATIENT
Start: 2019-10-14 | End: 2019-10-15 | Stop reason: HOSPADM

## 2019-10-14 RX ORDER — KETOROLAC TROMETHAMINE 30 MG/ML
30 INJECTION, SOLUTION INTRAMUSCULAR; INTRAVENOUS EVERY 6 HOURS PRN
Status: DISCONTINUED | OUTPATIENT
Start: 2019-10-14 | End: 2019-10-15 | Stop reason: HOSPADM

## 2019-10-14 RX ORDER — LABETALOL HYDROCHLORIDE 5 MG/ML
5 INJECTION, SOLUTION INTRAVENOUS
Status: DISCONTINUED | OUTPATIENT
Start: 2019-10-14 | End: 2019-10-14 | Stop reason: HOSPADM

## 2019-10-14 RX ORDER — GABAPENTIN 250 MG/5ML
250 SOLUTION ORAL ONCE
Status: COMPLETED | OUTPATIENT
Start: 2019-10-14 | End: 2019-10-14

## 2019-10-14 RX ORDER — DEXAMETHASONE SODIUM PHOSPHATE 4 MG/ML
INJECTION, SOLUTION INTRA-ARTICULAR; INTRALESIONAL; INTRAMUSCULAR; INTRAVENOUS; SOFT TISSUE AS NEEDED
Status: DISCONTINUED | OUTPATIENT
Start: 2019-10-14 | End: 2019-10-14 | Stop reason: SURG

## 2019-10-14 RX ORDER — PROMETHAZINE HYDROCHLORIDE 25 MG/ML
12.5 INJECTION, SOLUTION INTRAMUSCULAR; INTRAVENOUS EVERY 6 HOURS PRN
Status: DISCONTINUED | OUTPATIENT
Start: 2019-10-14 | End: 2019-10-15 | Stop reason: HOSPADM

## 2019-10-14 RX ORDER — BUPIVACAINE HYDROCHLORIDE 5 MG/ML
INJECTION, SOLUTION PERINEURAL AS NEEDED
Status: DISCONTINUED | OUTPATIENT
Start: 2019-10-14 | End: 2019-10-14 | Stop reason: HOSPADM

## 2019-10-14 RX ORDER — LIDOCAINE HYDROCHLORIDE 10 MG/ML
INJECTION, SOLUTION INFILTRATION; PERINEURAL AS NEEDED
Status: DISCONTINUED | OUTPATIENT
Start: 2019-10-14 | End: 2019-10-14 | Stop reason: HOSPADM

## 2019-10-14 RX ORDER — NALOXONE HCL 0.4 MG/ML
0.4 VIAL (ML) INJECTION AS NEEDED
Status: DISCONTINUED | OUTPATIENT
Start: 2019-10-14 | End: 2019-10-14 | Stop reason: HOSPADM

## 2019-10-14 RX ORDER — SODIUM CHLORIDE 0.9 % (FLUSH) 0.9 %
10 SYRINGE (ML) INJECTION AS NEEDED
Status: DISCONTINUED | OUTPATIENT
Start: 2019-10-14 | End: 2019-10-14 | Stop reason: HOSPADM

## 2019-10-14 RX ORDER — SUCCINYLCHOLINE CHLORIDE 20 MG/ML
INJECTION INTRAMUSCULAR; INTRAVENOUS AS NEEDED
Status: DISCONTINUED | OUTPATIENT
Start: 2019-10-14 | End: 2019-10-14 | Stop reason: SURG

## 2019-10-14 RX ORDER — DIPHENHYDRAMINE HYDROCHLORIDE 50 MG/ML
25 INJECTION INTRAMUSCULAR; INTRAVENOUS EVERY 6 HOURS PRN
Status: DISCONTINUED | OUTPATIENT
Start: 2019-10-14 | End: 2019-10-15 | Stop reason: HOSPADM

## 2019-10-14 RX ORDER — SODIUM CHLORIDE 0.9 % (FLUSH) 0.9 %
3 SYRINGE (ML) INJECTION EVERY 12 HOURS SCHEDULED
Status: DISCONTINUED | OUTPATIENT
Start: 2019-10-14 | End: 2019-10-15 | Stop reason: HOSPADM

## 2019-10-14 RX ORDER — BUPIVACAINE HCL/0.9 % NACL/PF 0.1 %
2 PLASTIC BAG, INJECTION (ML) EPIDURAL EVERY 8 HOURS
Status: COMPLETED | OUTPATIENT
Start: 2019-10-14 | End: 2019-10-15

## 2019-10-14 RX ORDER — ACETAMINOPHEN 325 MG/1
650 TABLET ORAL EVERY 6 HOURS PRN
Status: ON HOLD | COMMUNITY
End: 2019-10-14

## 2019-10-14 RX ORDER — GLYCOPYRROLATE 0.2 MG/ML
INJECTION INTRAMUSCULAR; INTRAVENOUS AS NEEDED
Status: DISCONTINUED | OUTPATIENT
Start: 2019-10-14 | End: 2019-10-14 | Stop reason: SURG

## 2019-10-14 RX ORDER — MIDAZOLAM HYDROCHLORIDE 1 MG/ML
2 INJECTION INTRAMUSCULAR; INTRAVENOUS
Status: DISCONTINUED | OUTPATIENT
Start: 2019-10-14 | End: 2019-10-14 | Stop reason: HOSPADM

## 2019-10-14 RX ORDER — SODIUM CHLORIDE, SODIUM LACTATE, POTASSIUM CHLORIDE, CALCIUM CHLORIDE 600; 310; 30; 20 MG/100ML; MG/100ML; MG/100ML; MG/100ML
100 INJECTION, SOLUTION INTRAVENOUS CONTINUOUS
Status: DISCONTINUED | OUTPATIENT
Start: 2019-10-14 | End: 2019-10-15

## 2019-10-14 RX ORDER — CEFAZOLIN SODIUM IN 0.9 % NACL 3 G/100 ML
3 INTRAVENOUS SOLUTION, PIGGYBACK (ML) INTRAVENOUS ONCE
Status: COMPLETED | OUTPATIENT
Start: 2019-10-14 | End: 2019-10-14

## 2019-10-14 RX ORDER — METOCLOPRAMIDE HYDROCHLORIDE 5 MG/ML
5 INJECTION INTRAMUSCULAR; INTRAVENOUS EVERY 6 HOURS
Status: DISCONTINUED | OUTPATIENT
Start: 2019-10-14 | End: 2019-10-15 | Stop reason: HOSPADM

## 2019-10-14 RX ORDER — PROPOFOL 10 MG/ML
VIAL (ML) INTRAVENOUS AS NEEDED
Status: DISCONTINUED | OUTPATIENT
Start: 2019-10-14 | End: 2019-10-14 | Stop reason: SURG

## 2019-10-14 RX ORDER — SODIUM CHLORIDE 0.9 % (FLUSH) 0.9 %
3 SYRINGE (ML) INJECTION EVERY 12 HOURS SCHEDULED
Status: DISCONTINUED | OUTPATIENT
Start: 2019-10-14 | End: 2019-10-14 | Stop reason: HOSPADM

## 2019-10-14 RX ORDER — IPRATROPIUM BROMIDE AND ALBUTEROL SULFATE 2.5; .5 MG/3ML; MG/3ML
3 SOLUTION RESPIRATORY (INHALATION) ONCE AS NEEDED
Status: DISCONTINUED | OUTPATIENT
Start: 2019-10-14 | End: 2019-10-14 | Stop reason: HOSPADM

## 2019-10-14 RX ORDER — SODIUM CHLORIDE 0.9 % (FLUSH) 0.9 %
3 SYRINGE (ML) INJECTION AS NEEDED
Status: DISCONTINUED | OUTPATIENT
Start: 2019-10-14 | End: 2019-10-14 | Stop reason: HOSPADM

## 2019-10-14 RX ORDER — SODIUM CHLORIDE, SODIUM LACTATE, POTASSIUM CHLORIDE, CALCIUM CHLORIDE 600; 310; 30; 20 MG/100ML; MG/100ML; MG/100ML; MG/100ML
1000 INJECTION, SOLUTION INTRAVENOUS CONTINUOUS
Status: DISCONTINUED | OUTPATIENT
Start: 2019-10-14 | End: 2019-10-15 | Stop reason: HOSPADM

## 2019-10-14 RX ORDER — KETOROLAC TROMETHAMINE 30 MG/ML
INJECTION, SOLUTION INTRAMUSCULAR; INTRAVENOUS AS NEEDED
Status: DISCONTINUED | OUTPATIENT
Start: 2019-10-14 | End: 2019-10-14 | Stop reason: SURG

## 2019-10-14 RX ORDER — MIDAZOLAM HYDROCHLORIDE 1 MG/ML
1 INJECTION INTRAMUSCULAR; INTRAVENOUS
Status: DISCONTINUED | OUTPATIENT
Start: 2019-10-14 | End: 2019-10-14 | Stop reason: HOSPADM

## 2019-10-14 RX ORDER — ROCURONIUM BROMIDE 10 MG/ML
INJECTION, SOLUTION INTRAVENOUS AS NEEDED
Status: DISCONTINUED | OUTPATIENT
Start: 2019-10-14 | End: 2019-10-14 | Stop reason: SURG

## 2019-10-14 RX ORDER — SODIUM CHLORIDE, SODIUM LACTATE, POTASSIUM CHLORIDE, CALCIUM CHLORIDE 600; 310; 30; 20 MG/100ML; MG/100ML; MG/100ML; MG/100ML
75 INJECTION, SOLUTION INTRAVENOUS CONTINUOUS
Status: DISCONTINUED | OUTPATIENT
Start: 2019-10-14 | End: 2019-10-15 | Stop reason: HOSPADM

## 2019-10-14 RX ORDER — ONDANSETRON 2 MG/ML
INJECTION INTRAMUSCULAR; INTRAVENOUS AS NEEDED
Status: DISCONTINUED | OUTPATIENT
Start: 2019-10-14 | End: 2019-10-14 | Stop reason: SURG

## 2019-10-14 RX ORDER — MAGNESIUM HYDROXIDE 1200 MG/15ML
LIQUID ORAL AS NEEDED
Status: DISCONTINUED | OUTPATIENT
Start: 2019-10-14 | End: 2019-10-14 | Stop reason: HOSPADM

## 2019-10-14 RX ORDER — ONDANSETRON 2 MG/ML
4 INJECTION INTRAMUSCULAR; INTRAVENOUS ONCE AS NEEDED
Status: COMPLETED | OUTPATIENT
Start: 2019-10-14 | End: 2019-10-14

## 2019-10-14 RX ORDER — LIDOCAINE HYDROCHLORIDE 20 MG/ML
INJECTION, SOLUTION INFILTRATION; PERINEURAL AS NEEDED
Status: DISCONTINUED | OUTPATIENT
Start: 2019-10-14 | End: 2019-10-14 | Stop reason: SURG

## 2019-10-14 RX ADMIN — FAMOTIDINE 20 MG: 10 INJECTION, SOLUTION INTRAVENOUS at 11:33

## 2019-10-14 RX ADMIN — HYDROCODONE BITARTRATE AND ACETAMINOPHEN 20 ML: 7.5; 325 SOLUTION ORAL at 19:52

## 2019-10-14 RX ADMIN — FENTANYL CITRATE 25 MCG: 50 INJECTION INTRAMUSCULAR; INTRAVENOUS at 10:09

## 2019-10-14 RX ADMIN — SUCCINYLCHOLINE CHLORIDE 120 MG: 20 INJECTION, SOLUTION INTRAMUSCULAR; INTRAVENOUS at 07:50

## 2019-10-14 RX ADMIN — SCOPALAMINE 1 PATCH: 1 PATCH, EXTENDED RELEASE TRANSDERMAL at 07:06

## 2019-10-14 RX ADMIN — INSULIN LISPRO 2 UNITS: 100 INJECTION, SOLUTION INTRAVENOUS; SUBCUTANEOUS at 17:15

## 2019-10-14 RX ADMIN — ONDANSETRON HYDROCHLORIDE 4 MG: 2 SOLUTION INTRAMUSCULAR; INTRAVENOUS at 16:01

## 2019-10-14 RX ADMIN — FENTANYL CITRATE 100 MCG: 50 INJECTION INTRAMUSCULAR; INTRAVENOUS at 07:53

## 2019-10-14 RX ADMIN — KETOROLAC TROMETHAMINE 30 MG: 30 INJECTION, SOLUTION INTRAMUSCULAR at 09:22

## 2019-10-14 RX ADMIN — INSULIN LISPRO 2 UNITS: 100 INJECTION, SOLUTION INTRAVENOUS; SUBCUTANEOUS at 22:14

## 2019-10-14 RX ADMIN — DEXAMETHASONE SODIUM PHOSPHATE 4 MG: 4 INJECTION, SOLUTION INTRAMUSCULAR; INTRAVENOUS at 09:15

## 2019-10-14 RX ADMIN — ONDANSETRON HYDROCHLORIDE 4 MG: 2 SOLUTION INTRAMUSCULAR; INTRAVENOUS at 22:15

## 2019-10-14 RX ADMIN — CEFAZOLIN SODIUM 2 G: 10 INJECTION, POWDER, FOR SOLUTION INTRAVENOUS at 23:42

## 2019-10-14 RX ADMIN — LIDOCAINE HYDROCHLORIDE 40 MG: 20 INJECTION, SOLUTION INFILTRATION; PERINEURAL at 07:50

## 2019-10-14 RX ADMIN — METOCLOPRAMIDE 5 MG: 5 INJECTION, SOLUTION INTRAMUSCULAR; INTRAVENOUS at 17:03

## 2019-10-14 RX ADMIN — METOCLOPRAMIDE 5 MG: 5 INJECTION, SOLUTION INTRAMUSCULAR; INTRAVENOUS at 23:42

## 2019-10-14 RX ADMIN — SUGAMMADEX 100 MG: 100 INJECTION, SOLUTION INTRAVENOUS at 09:24

## 2019-10-14 RX ADMIN — GABAPENTIN 250 MG: 250 SOLUTION ORAL at 06:17

## 2019-10-14 RX ADMIN — PROPOFOL 50 MG: 10 INJECTION, EMULSION INTRAVENOUS at 09:18

## 2019-10-14 RX ADMIN — FAMOTIDINE 20 MG: 10 INJECTION, SOLUTION INTRAVENOUS at 21:24

## 2019-10-14 RX ADMIN — SODIUM CHLORIDE, POTASSIUM CHLORIDE, SODIUM LACTATE AND CALCIUM CHLORIDE 140 ML/HR: 600; 310; 30; 20 INJECTION, SOLUTION INTRAVENOUS at 20:12

## 2019-10-14 RX ADMIN — MIDAZOLAM HYDROCHLORIDE 2 MG: 1 INJECTION, SOLUTION INTRAMUSCULAR; INTRAVENOUS at 07:06

## 2019-10-14 RX ADMIN — ROCURONIUM BROMIDE 5 MG: 10 INJECTION INTRAVENOUS at 07:50

## 2019-10-14 RX ADMIN — FENTANYL CITRATE 150 MCG: 50 INJECTION INTRAMUSCULAR; INTRAVENOUS at 07:50

## 2019-10-14 RX ADMIN — METOCLOPRAMIDE 5 MG: 5 INJECTION, SOLUTION INTRAMUSCULAR; INTRAVENOUS at 11:34

## 2019-10-14 RX ADMIN — CEFAZOLIN SODIUM 2 G: 10 INJECTION, POWDER, FOR SOLUTION INTRAVENOUS at 16:00

## 2019-10-14 RX ADMIN — SODIUM CHLORIDE, POTASSIUM CHLORIDE, SODIUM LACTATE AND CALCIUM CHLORIDE 140 ML/HR: 600; 310; 30; 20 INJECTION, SOLUTION INTRAVENOUS at 11:34

## 2019-10-14 RX ADMIN — SODIUM CHLORIDE, POTASSIUM CHLORIDE, SODIUM LACTATE AND CALCIUM CHLORIDE 1000 ML: 600; 310; 30; 20 INJECTION, SOLUTION INTRAVENOUS at 06:41

## 2019-10-14 RX ADMIN — HYDROCODONE BITARTRATE AND ACETAMINOPHEN 10 ML: 7.5; 325 SOLUTION ORAL at 14:21

## 2019-10-14 RX ADMIN — ONDANSETRON HYDROCHLORIDE 4 MG: 2 SOLUTION INTRAMUSCULAR; INTRAVENOUS at 09:15

## 2019-10-14 RX ADMIN — ONDANSETRON HYDROCHLORIDE 4 MG: 2 SOLUTION INTRAMUSCULAR; INTRAVENOUS at 10:07

## 2019-10-14 RX ADMIN — GLYCOPYRROLATE 0.2 MG: 0.2 INJECTION, SOLUTION INTRAMUSCULAR; INTRAVENOUS at 07:56

## 2019-10-14 RX ADMIN — ROCURONIUM BROMIDE 30 MG: 10 INJECTION INTRAVENOUS at 07:54

## 2019-10-14 RX ADMIN — CEFAZOLIN 3 G: 1 INJECTION, POWDER, FOR SOLUTION INTRAMUSCULAR; INTRAVENOUS; PARENTERAL at 07:48

## 2019-10-14 RX ADMIN — DEXAMETHASONE SODIUM PHOSPHATE 4 MG: 4 INJECTION, SOLUTION INTRAMUSCULAR; INTRAVENOUS at 07:06

## 2019-10-14 RX ADMIN — FENTANYL CITRATE 25 MCG: 50 INJECTION INTRAMUSCULAR; INTRAVENOUS at 10:07

## 2019-10-14 RX ADMIN — SODIUM CHLORIDE, POTASSIUM CHLORIDE, SODIUM LACTATE AND CALCIUM CHLORIDE 100 ML/HR: 600; 310; 30; 20 INJECTION, SOLUTION INTRAVENOUS at 07:12

## 2019-10-14 RX ADMIN — INSULIN LISPRO 2 UNITS: 100 INJECTION, SOLUTION INTRAVENOUS; SUBCUTANEOUS at 12:26

## 2019-10-14 RX ADMIN — PROPOFOL 200 MG: 10 INJECTION, EMULSION INTRAVENOUS at 07:50

## 2019-10-14 RX ADMIN — KETOROLAC TROMETHAMINE 30 MG: 30 INJECTION, SOLUTION INTRAMUSCULAR; INTRAVENOUS at 11:33

## 2019-10-14 RX ADMIN — ENOXAPARIN SODIUM 40 MG: 40 INJECTION SUBCUTANEOUS at 07:06

## 2019-10-14 RX ADMIN — SODIUM CHLORIDE, POTASSIUM CHLORIDE, SODIUM LACTATE AND CALCIUM CHLORIDE 500 ML: 600; 310; 30; 20 INJECTION, SOLUTION INTRAVENOUS at 06:40

## 2019-10-14 NOTE — OP NOTE
Laparoscopic Sleeve Gastrectomy     Pre operative diagnosis: There is no height or weight on file to calculate BMI.     Post op diagnosis: as above      Indications: The patient was admitted to the hospital with history of morbid obesity with a There is no height or weight on file to calculate BMI..   she is scheduled for a Laparoscopic Sleeve Gastrectomy.       Surgeon: Luc Hendrickson MD     Assistants: Albin White Anne    Anesthesia: General endotracheal anesthesia    ASA Class: 3          Procedure Details       After the patient was explained the alternatives, benefits, complications, and risks of the laparoscopic sleeve gastrectomy informed consent was provided.  The patient was brought to the or suite after receiving preoperative antibiotics medications and anticoagulation.  The patient was placed under general anesthesia.  The patient was then prepped and draped in standard fashion.  We performed a surgical Timeout.  A 40 Bengali bougie was placed into the oropharynx by anesthesia and guided down into the stomach followed by placement to suction I then proceeded to locally anesthetize a supraumbilical site for placement of a 12 mm incision which was followed by placement of a 12 mm trocar into the abdominal cavity with camera assist.  I insufflated the abdominal cavity to a pressure of 15 mmHg.  A 5 mm incision was placed in the subxiphoid location for placement of a 5 mm trocar under direct visualization.  I removed this trocar and replaced it with a Shoaib liver retractor.  Once adequate exposure of the stomach was obtained I proceeded with placement of a 5 mm trocar at a left lateral position and 15 mm trocar in the right upper quadrant after incisions were made under direct visualization. A 12 mm trocar was placed in the right lower quadrant under direct visualization.  Note that all skin wounds were locally anesthetized prior to making incisions for placement of the trochars.  I requested the  patient be placed in reverse Trendelenburg.  I then requested anesthesia to first break the suction seal with air and then advance the 40 Kazakh bougie into the patient's stomach with assistance by myself under direct visualization for appropriate positioning for the creation of the sleeve gastrectomy.  The bougie was then placed to suction.  I dissected the fat pad near the angle of Hiss away from the diaphragm.  I then proceeded with ligating the greater curvature vessels starting at my most distal ligation site 6 cm from the pylorus.  I continued proximally along the greater curvature ligating the vessels as well as a short gastrics.  This was all accomplished with the harmonic device.  Once completion of ligation of the vessels was obtained. I dissected away attachments found posteriorly to the stomach.  Care was made to assure no injury to the pancreas. Completion of my dissection was obtained once visualization of the posterior left crural muscle seen. Upon completing the ligation of the short gastrics there was oozing from a ligated vessel.  I controlled this with the harmonic, placed surgicel snow on the area and proceeded with the sleeve gastrectomy.   The sleeve gastrectomy was then created. I then completed the firing of the stapler device to create the sleeve gastrectomy.  I utilized seam guard covering over the staplers cartridge.  My most distal staple line was approximately 6 cm from the pylorus and I continued proximally along the greater curvature assuring that the width from the angularis incisura was at least 3 cm.  My final proximal stapler was at least 1-2 cm from the GE junction.  I then assessed the staple line to observe for hemostasis.  I also reassessed the area of the spleen where there was previous oozing and noted it was hemostatic.  I then proceeded with my leak test.  This was performed by placing the patient in Trendelenburg and irrigating around the sleeve gastrectomy with  saline. I  assessed for leaks by positioning the staple line and submerging it under saline and then requested anesthesia to insufflate the bougie with air. I then assessed for air bubbles along the staple line.  Once there was no evidence of leakage I then requested that the bougie be placed off suction and removed the fluid from the abdominal cavity.  The bougie was then removed under direct visualization off suction and after the suction seal was broken with air under direct visualization. I placed a stitch at the proximal sleeve site to imbricate omentum to this area.    I placed tacele and snow over the area where there was previous oozing.   I then proceeded to remove the resected portion of the stomach through the 15 mm trocar under direct visualization.  I reassessed for hemostasis, placed surgicel snow on the staple line, and proceeded to closing.  The liver retractor was removed under direct visualization.  I proceeded with closing the fascia defects of the 15 mm trocar and supraumbilical trocar sites with a Adi Calix device using an 0 Vicryl suture. as well as the pneumoperitoneum and remaining trochars.  Then re-locally anesthetized skin wounds for closure.  Skin wounds were closed with 4-0 Monocryl.  Prior to closing skin wounds all lap pads and instruments were accounted for at the end of the procedure.    Black staples: 1  Green staples: 5    Findings:wnl    Estimated Blood Loss:  minimal                    Total IV Fluids: 700 ml           Specimens:   Specimens     ID Source Type Tests Collected By Collected At Frozen?      A Stomach Tissue · TISSUE PATHOLOGY EXAM   Luc Hendrickson MD 10/14/19 0901 No     Description: Fundus of Stomach                 Implants:   Implant Name Type Inv. Item Serial No.  Lot No. LRB No. Used   RELOAD ECHELON FLEX GST THK 4.1MM GRN - [id number] Implant RELOAD ECHELON FLEX GST THK 4.1MM GRN  [employer]  DIV OF J AND J [id number] N/A 1   RELOAD ECHELON  FLEX GST THK 4.1MM GRN - [id number] Implant RELOAD ECHELON FLEX GST THK 4.1MM GRN  [employer]  DIV OF J AND J [id number] N/A 1   RELOAD ECHELON FLEX GST XTHK 4.2MM BLK - [id number] Implant RELOAD ECHELON FLEX GST XTHK 4.2MM BLK  [employer]  DIV OF J AND J [id number] N/A 1   STPL LN SEAMGUARD ECHELON FLX [id number] - [id number] Implant STPL LN SEAMGUARD ECHELON FLX [id number]  [employer] [id number] N/A 6   RELOAD ECHELON FLEX GST THK 4.1MM GRN - [id number] Implant RELOAD ECHELON FLEX GST THK 4.1MM GRN  [employer]  DIV OF J AND J [id number] N/A 1   RELOAD ECHELON FLEX GST THK 4.1MM GRN - [id number] Implant RELOAD ECHELON FLEX GST THK 4.1MM GRN  [employer]  DIV OF J AND J [id number] N/A 1   RELOAD ECHELON FLEX GST THK 4.1MM GRN - [id number] Implant RELOAD ECHELON FLEX GST THK 4.1MM GRN  [employer]  DIV OF J AND J [id number] N/A 1   SEAL FIBRIN TISSEEL FZ 4ML - [id number] Implant SEAL FIBRIN TISSEEL FZ 4ML  [employer]ICH[employer] [id number] N/A 1              Complications:  none           Disposition: PACU - hemodynamically stable.           Condition: stable

## 2019-10-14 NOTE — ANESTHESIA PREPROCEDURE EVALUATION
Anesthesia Evaluation     Patient summary reviewed and Nursing notes reviewed   no history of anesthetic complications:  NPO Solid Status: > 8 hours  NPO Liquid Status: > 2 hours           Airway   Mallampati: II  TM distance: >3 FB  Neck ROM: full  Dental - normal exam     Pulmonary    (+) sleep apnea on CPAP,   (-) asthma, not a smoker  Cardiovascular   Exercise tolerance: good (4-7 METS)    ECG reviewed    (+) hypertension,   (-) past MI, CAD, angina, cardiac stents      Neuro/Psych  (+) psychiatric history Anxiety and Depression,     (-) seizures, TIA, CVA  GI/Hepatic/Renal/Endo    (+) morbid obesity,  diabetes mellitus,   (-) liver disease, no renal disease    ROS Comment: Secondary hyperparathyroid, seeing endocrine in [place]    Musculoskeletal     Abdominal    Substance History      OB/GYN          Other                          Anesthesia Plan    ASA 3     general     intravenous induction   Anesthetic plan, all risks, benefits, and alternatives have been provided, discussed and informed consent has been obtained with: patient.

## 2019-10-14 NOTE — INTERVAL H&P NOTE
H&P updated. The patient was examined and the following changes are noted:  taken T, G, G and E

## 2019-10-14 NOTE — ANESTHESIA PROCEDURE NOTES
Airway  Urgency: elective    Date/Time: 10/14/2019 7:50 AM  Airway not difficult    General Information and Staff    Patient location during procedure: OR  CRNA: Mñeo Hernandez CRNA    Indications and Patient Condition  Indications for airway management: airway protection    Preoxygenated: yes  MILS not maintained throughout  Mask difficulty assessment: 1 - vent by mask    Final Airway Details  Final airway type: endotracheal airway      Successful airway: ETT  Cuffed: yes   Successful intubation technique: direct laryngoscopy  Facilitating devices/methods: intubating stylet  Endotracheal tube insertion site: oral  Blade: Velasco  Blade size: 2  ETT size (mm): 7.5  Cormack-Lehane Classification: grade I - full view of glottis  Placement verified by: chest auscultation and capnometry   Measured from: lips  ETT/EBT  to lips (cm): 22  Number of attempts at approach: 1  Assessment: lips, teeth, and gum same as pre-op and atraumatic intubation

## 2019-10-14 NOTE — BRIEF OP NOTE
GASTRIC SLEEVE LAPAROSCOPIC  Progress Note    Kristine Veloz  10/14/2019    Pre-op Diagnosis:   Class 3 severe obesity due to excess calories with serious comorbidity and body mass index (BMI) of 45.0 to 49.9 in adult (CMS/[facility]) [E66.01, Z68.42]       Post-Op Diagnosis Codes:     * Class 3 severe obesity due to excess calories with serious comorbidity and body mass index (BMI) of 45.0 to 49.9 in adult (CMS/[facility]) [E66.01, Z68.42]    Procedure/CPT® Codes:      Procedure(s):  GASTRIC SLEEVE LAPAROSCOPIC    Surgeon(s):  Luc Hendrickson MD    Anesthesia: General    Staff:   Circulator: Arturo Bowers RN  Scrub Person: Josh Cortes Anne K  Assistant: Cindy Stout    Estimated Blood Loss: minimal    Urine Voided: * No values recorded between 10/14/2019  7:45 AM and 10/14/2019  9:26 AM *    Specimens:                Specimens     ID Source Type Tests Collected By Collected At Frozen?      A Stomach Tissue · TISSUE PATHOLOGY EXAM   Luc Hendrickson MD 10/14/19 0901 No     Description: Fundus of Stomach                Findings: wnl    Complications: none      Luc Hendrickson MD     Date: 10/14/2019  Time: 9:26 AM

## 2019-10-14 NOTE — PLAN OF CARE
Problem: Patient Care Overview  Goal: Plan of Care Review  Outcome: Ongoing (interventions implemented as appropriate)   10/14/19 1025 10/14/19 1518   Plan of Care Review   Progress --  improving   OTHER   Outcome Summary --  Pt came to floor today post gastric sleeve. Pt c/o pain and nausea. PRN pain meds and scheduled reglan given. Pt up ad ivonne and ambulating hallways. SCDs on when in bed. Abdominal binder clean and dry. 5 laps with dermabond free of infection. VSS. Will continiue to monitor.    Coping/Psychosocial   Plan of Care Reviewed With patient --      Goal: Discharge Needs Assessment  Outcome: Ongoing (interventions implemented as appropriate)   10/14/19 1518   Discharge Needs Assessment   Readmission Within the Last 30 Days no previous admission in last 30 days   Concerns to be Addressed no discharge needs identified   Patient/Family Anticipates Transition to home   Patient/Family Anticipated Services at Transition none   Transportation Anticipated car, drives self;family or friend will provide   Anticipated Changes Related to Illness none   Equipment Needed After Discharge none   Disability   Equipment Currently Used at Home none     Goal: Interprofessional Rounds/Family Conf  Outcome: Ongoing (interventions implemented as appropriate)   10/14/19 1518   Interdisciplinary Rounds/Family Conf   Participants family;patient;physician;nursing       Problem: Bariatric Surgery (Adult,Pediatric)  Goal: Signs and Symptoms of Listed Potential Problems Will be Absent, Minimized or Managed (Bariatric Surgery)  Outcome: Ongoing (interventions implemented as appropriate)   10/14/19 1518   Goal/Outcome Evaluation   Problems Assessed (Bariatric Surgery) all   Problems Present (Bariatric Surgery) bowel motility decreased;pain;postoperative nausea and vomiting;postoperative urinary retention     Goal: Anesthesia/Sedation Recovery  Outcome: Outcome(s) achieved Date Met: 10/14/19   10/14/19 1025   Goal/Outcome Evaluation    Anesthesia/Sedation Recovery criteria met for discharge

## 2019-10-14 NOTE — ANESTHESIA POSTPROCEDURE EVALUATION
Patient: Kristine Veloz    Procedure Summary     Date:  10/14/19 Room / Location:   [facility] OR 14 /  [facility] OR    Anesthesia Start:  0745 Anesthesia Stop:  0930    Procedure:  GASTRIC SLEEVE LAPAROSCOPIC (N/A Abdomen) Diagnosis:       Class 3 severe obesity due to excess calories with serious comorbidity and body mass index (BMI) of 45.0 to 49.9 in adult (CMS/HCC)      (Class 3 severe obesity due to excess calories with serious comorbidity and body mass index (BMI) of 45.0 to 49.9 in adult (CMS/HCC) [E66.01, Z68.42])    Surgeon:  Luc Hendrickson MD Provider:  Meño Hernandez CRNA    Anesthesia Type:  general ASA Status:  3          Anesthesia Type: general  Last vitals  BP   115/54 (10/14/19 1145)   Temp   98.2 °F (36.8 °C) (10/14/19 1145)   Pulse   63 (10/14/19 1145)   Resp   16 (10/14/19 1145)     SpO2   91 % (10/14/19 1145)     Post Anesthesia Care and Evaluation    PONV Status: none  Comments: Patient d/c from PACU prior to anes eval based on Analisa score.  Please see RN notes for details of d/c criteria.    Blood pressure 115/54, pulse 63, temperature 98.2 °F (36.8 °C), resp. rate 16, last menstrual period 10/04/2019, SpO2 91 %, not currently breastfeeding.

## 2019-10-15 VITALS
RESPIRATION RATE: 16 BRPM | SYSTOLIC BLOOD PRESSURE: 118 MMHG | TEMPERATURE: 98.1 F | HEIGHT: 64 IN | OXYGEN SATURATION: 98 % | WEIGHT: 250 LBS | BODY MASS INDEX: 42.68 KG/M2 | HEART RATE: 63 BPM | DIASTOLIC BLOOD PRESSURE: 61 MMHG

## 2019-10-15 LAB
GLUCOSE BLDC GLUCOMTR-MCNC: 111 MG/DL (ref 70–130)
GLUCOSE BLDC GLUCOMTR-MCNC: 121 MG/DL (ref 70–130)

## 2019-10-15 PROCEDURE — 94799 UNLISTED PULMONARY SVC/PX: CPT

## 2019-10-15 PROCEDURE — 82962 GLUCOSE BLOOD TEST: CPT

## 2019-10-15 PROCEDURE — 94760 N-INVAS EAR/PLS OXIMETRY 1: CPT

## 2019-10-15 PROCEDURE — 25010000002 METOCLOPRAMIDE PER 10 MG: Performed by: SURGERY

## 2019-10-15 PROCEDURE — 25010000002 KETOROLAC TROMETHAMINE PER 15 MG: Performed by: SURGERY

## 2019-10-15 PROCEDURE — 25010000002 ONDANSETRON PER 1 MG: Performed by: SURGERY

## 2019-10-15 PROCEDURE — 25010000002 ENOXAPARIN PER 10 MG: Performed by: SURGERY

## 2019-10-15 RX ORDER — ONDANSETRON 4 MG/1
4 TABLET, ORALLY DISINTEGRATING ORAL EVERY 8 HOURS PRN
Qty: 30 TABLET | Refills: 1 | Status: SHIPPED | OUTPATIENT
Start: 2019-10-15 | End: 2020-01-20

## 2019-10-15 RX ORDER — SIMETHICONE 80 MG
40 TABLET,CHEWABLE ORAL EVERY 6 HOURS PRN
Qty: 30 TABLET | Refills: 1 | Status: SHIPPED | OUTPATIENT
Start: 2019-10-15 | End: 2020-01-20

## 2019-10-15 RX ORDER — HYDROCHLOROTHIAZIDE 25 MG/1
12.5 TABLET ORAL DAILY
Qty: 30 TABLET | Refills: 0 | Status: SHIPPED | OUTPATIENT
Start: 2019-10-15 | End: 2020-01-20 | Stop reason: ALTCHOICE

## 2019-10-15 RX ADMIN — HYDROCODONE BITARTRATE AND ACETAMINOPHEN 20 ML: 7.5; 325 SOLUTION ORAL at 03:35

## 2019-10-15 RX ADMIN — ONDANSETRON HYDROCHLORIDE 4 MG: 2 SOLUTION INTRAMUSCULAR; INTRAVENOUS at 09:54

## 2019-10-15 RX ADMIN — ONDANSETRON HYDROCHLORIDE 4 MG: 2 SOLUTION INTRAMUSCULAR; INTRAVENOUS at 03:34

## 2019-10-15 RX ADMIN — METOCLOPRAMIDE 5 MG: 5 INJECTION, SOLUTION INTRAMUSCULAR; INTRAVENOUS at 05:13

## 2019-10-15 RX ADMIN — FAMOTIDINE 20 MG: 10 INJECTION, SOLUTION INTRAVENOUS at 08:16

## 2019-10-15 RX ADMIN — HYDROCODONE BITARTRATE AND ACETAMINOPHEN 10 ML: 7.5; 325 SOLUTION ORAL at 13:02

## 2019-10-15 RX ADMIN — ENOXAPARIN SODIUM 40 MG: 40 INJECTION SUBCUTANEOUS at 08:16

## 2019-10-15 RX ADMIN — KETOROLAC TROMETHAMINE 30 MG: 30 INJECTION, SOLUTION INTRAMUSCULAR; INTRAVENOUS at 10:00

## 2019-10-15 RX ADMIN — SODIUM CHLORIDE, POTASSIUM CHLORIDE, SODIUM LACTATE AND CALCIUM CHLORIDE 140 ML/HR: 600; 310; 30; 20 INJECTION, SOLUTION INTRAVENOUS at 03:20

## 2019-10-15 NOTE — PLAN OF CARE
Problem: Patient Care Overview  Goal: Plan of Care Review  Outcome: Ongoing (interventions implemented as appropriate)   10/15/19 0402   Plan of Care Review   Progress improving   OTHER   Outcome Summary pain med given x 2; 5 laps with dermabond then binder overlaid ; SCD's in place; Pt up ad ivonne to BR; scheduled anti emetic given   Coping/Psychosocial   Plan of Care Reviewed With patient       Problem: Bariatric Surgery (Adult,Pediatric)  Goal: Signs and Symptoms of Listed Potential Problems Will be Absent, Minimized or Managed (Bariatric Surgery)  Outcome: Ongoing (interventions implemented as appropriate)   10/14/19 1518 10/15/19 0402   Goal/Outcome Evaluation   Problems Assessed (Bariatric Surgery) --  all   Problems Present (Bariatric Surgery) bowel motility decreased;pain;postoperative nausea and vomiting;postoperative urinary retention --

## 2019-10-15 NOTE — PROGRESS NOTES
"Patient Care Team:  Tere Mcgill MD as PCP - General (Internal Medicine)  Emiliana Murphy MA as Medical Assistant    Chief Complaint: S/P Laparoscopic Sleeve Gastrectomy    Subjective     Kristine Veloz is POD 1 from a sleeve gastrectomy. Kristine Veloz is doing well today. She is tolerating ice chips currently without any complaints of N/V and will advance to clear liquids for breakfast. Kristine Veloz has been encouraged to use incentive spirometer every one hour while awake. She states incisional pain is currently being adequately controlled. Pain rating is 4 out of 10. Denies chest pain and SOA. Kristine Veloz has been voiding and walking without difficulty.     Review of Systems:     Review of Systems - General ROS: negative for - chills or fever  Respiratory ROS: no cough, shortness of breath, or wheezing  Cardiovascular ROS: no chest pain or dyspnea on exertion  Gastrointestinal ROS: positive for abdominal pain, negative for change in bowel habits, or black or bloody stools  Genito-Urinary ROS: no dysuria, trouble voiding, or hematuria  Musculoskeletal ROS: negative for - joint pain or muscle pain  Neurological ROS: negative for - confusion, dizziness, headaches, numbness/tingling, seizures or speech problems  Dermatological ROS: negative for pruritus and rash    Objective     Vital Signs  /58 (BP Location: Left arm, Patient Position: Lying)   Pulse 61   Temp 98.8 °F (37.1 °C) (Oral)   Resp 16   Ht 162.6 cm (64\")   Wt 113 kg (250 lb)   LMP 10/04/2019   SpO2 97%   Breastfeeding? No   BMI 42.91 kg/m²     Physical Exam:    HEENT: extra ocular movement intact  Respiratory: appears well, vitals normal, no respiratory distress, acyanotic, normal RR, chest clear, no wheezing, crepitations, rhonchi, normal symmetric air entry  Cardiovascular: Regular rate and rhythm, S1, S2 normal, no murmur, click, rub or gallop  GI: Soft, non-tender, normal bowel " sounds; no bruits, organomegaly or masses.  Abnormal shape: obese  Skin: scars- Laparoscopic incisions x 5, edges well approximated with no drainage noted, dermabond intact. Abdominal binder on.  Tenderness: Epigastric discomfort  Musculoskeletal: inspection - no abnormality, range of motion normal Bilateral knee high SCD in place.  Neurologic: alert, oriented, normal speech, no focal findings or movement disorder noted     Results Review:    None    Medication Reviewed:   Hospital Medications (active)       Dose Frequency Start End    ceFAZolin in 0.9% normal saline (ANCEF) IVPB solution 2 g 2 g Every 8 Hours 10/14/2019 10/15/2019    Sig - Route: Infuse 100 mL into a venous catheter Every 8 (Eight) Hours. - Intravenous    dexamethasone (DECADRON) injection 4 mg 4 mg Every 8 Hours PRN 10/14/2019     Sig - Route: Infuse 1 mL into a venous catheter Every 8 (Eight) Hours As Needed for Nausea or Vomiting. - Intravenous    diphenhydrAMINE (BENADRYL) injection 25 mg 25 mg Every 6 Hours PRN 10/14/2019     Sig - Route: Infuse 0.5 mL into a venous catheter Every 6 (Six) Hours As Needed for Itching. - Intravenous    enoxaparin (LOVENOX) syringe 40 mg 40 mg Daily 10/15/2019     Sig - Route: Inject 0.4 mL under the skin into the appropriate area as directed Daily. - Subcutaneous    famotidine (PEPCID) injection 20 mg 20 mg Every 12 Hours Scheduled 10/14/2019     Sig - Route: Infuse 2 mL into a venous catheter Every 12 (Twelve) Hours. - Intravenous    hydrALAZINE (APRESOLINE) injection 10 mg 10 mg Every 4 Hours PRN 10/14/2019     Sig - Route: Infuse 0.5 mL into a venous catheter Every 4 (Four) Hours As Needed for High Blood Pressure (SBP greater than 160 or DBP greater than 100, Hold for HR less than 60). - Intravenous    HYDROcodone-acetaminophen (HYCET) 7.5-325 MG/15ML solution 10 mL 10 mL Every 4 Hours PRN 10/14/2019 10/24/2019    Sig - Route: Take 10 mL by mouth Every 4 (Four) Hours As Needed for Moderate Pain . - Oral     HYDROcodone-acetaminophen (HYCET) 7.5-325 MG/15ML solution 20 mL 20 mL Every 4 Hours PRN 10/14/2019 10/24/2019    Sig - Route: Take 20 mL by mouth Every 4 (Four) Hours As Needed for Severe Pain . - Oral    insulin lispro (humaLOG) injection 0-7 Units 0-7 Units 4 Times Daily Before Meals & Nightly 10/14/2019     Sig - Route: Inject 0-7 Units under the skin into the appropriate area as directed 4 (Four) Times a Day Before Meals & at Bedtime. - Subcutaneous    ketorolac (TORADOL) injection 30 mg 30 mg Every 6 Hours PRN 10/14/2019 10/19/2019    Sig - Route: Infuse 1 mL into a venous catheter Every 6 (Six) Hours As Needed for Moderate Pain . - Intravenous    lactated ringers bolus 500 mL 500 mL Once 10/14/2019 10/14/2019    Sig - Route: Infuse 500 mL into a venous catheter 1 (One) Time. - Intravenous    lactated ringers infusion 1,000 mL 1,000 mL Continuous 10/14/2019 10/15/2019    Sig - Route: Infuse 1,000 mL into a venous catheter Continuous. - Intravenous    Cosign for Ordering: Required by Luc Hendrickson MD    lactated ringers infusion 75 mL/hr Continuous 10/14/2019     Sig - Route: Infuse 75 mL/hr into a venous catheter Continuous. - Intravenous    metoclopramide (REGLAN) injection 5 mg 5 mg Every 6 Hours 10/14/2019     Sig - Route: Infuse 1 mL into a venous catheter Every 6 (Six) Hours. - Intravenous    ondansetron (ZOFRAN) injection 4 mg 4 mg Once As Needed 10/14/2019 10/14/2019    Sig - Route: Infuse 2 mL into a venous catheter 1 (One) Time As Needed for Nausea or Vomiting. - Intravenous    ondansetron (ZOFRAN) injection 4 mg 4 mg Every 6 Hours 10/14/2019     Sig - Route: Infuse 2 mL into a venous catheter Every 6 (Six) Hours. - Intravenous    promethazine (PHENERGAN) injection 12.5 mg 12.5 mg Every 6 Hours PRN 10/14/2019     Sig - Route: Infuse 0.5 mL into a venous catheter Every 6 (Six) Hours As Needed for Nausea or Vomiting. - Intravenous    Scopolamine (TRANSDERM-SCOP) 1.5 MG/3DAYS patch 1 patch 1 patch  Once 10/14/2019     Sig - Route: Place 1 patch on the skin as directed by provider 1 (One) Time. - Transdermal    simethicone (MYLICON) chewable tablet 40 mg 40 mg 4 Times Daily PRN 10/14/2019     Sig - Route: Chew 0.5 tablets 4 (Four) Times a Day As Needed for Flatulence. - Oral    sodium chloride 0.9 % flush 1-10 mL 1-10 mL As Needed 10/14/2019     Sig - Route: Infuse 1-10 mL into a venous catheter As Needed for Line Care. - Intravenous    sodium chloride 0.9 % flush 3 mL 3 mL Every 12 Hours Scheduled 10/14/2019     Sig - Route: Infuse 3 mL into a venous catheter Every 12 (Twelve) Hours. - Intravenous    atropine sulfate injection 0.5 mg (Discontinued) 0.5 mg Once As Needed 10/14/2019 10/14/2019    Sig - Route: Infuse 5 mL into a venous catheter 1 (One) Time As Needed for Bradycardia (symptomatic bradycardia (hypotension, dizziness, confusion). Notify attending anesthesiologist.). - Intravenous    Reason for Discontinue: Patient Transfer    buffered lidocaine syringe 0.5 mL (Discontinued) 0.5 mL Once As Needed 10/14/2019 10/14/2019    Sig - Route: Inject 0.5 mL as directed 1 (One) Time As Needed (IV Start). - Injection    Reason for Discontinue: Patient Transfer    bupivacaine (MARCAINE) 0.5 % injection (Discontinued)  As Needed 10/14/2019 10/14/2019    Sig: As Needed.    Reason for Discontinue: Patient Discharge    dexamethasone (DECADRON) injection (Discontinued)  As Needed 10/14/2019 10/14/2019    Sig - Route: Infuse  into a venous catheter As Needed. - Intravenous    Reason for Discontinue: Anesthesia Stop    fentaNYL citrate (PF) (SUBLIMAZE) injection 25 mcg (Discontinued) 25 mcg As Needed 10/14/2019 10/14/2019    Sig - Route: Infuse 0.5 mL into a venous catheter As Needed for Moderate Pain  or Severe Pain  (for breakthrough pain). - Intravenous    Reason for Discontinue: Patient Transfer    fentaNYL citrate (PF) (SUBLIMAZE) injection (Discontinued)  As Needed 10/14/2019 10/14/2019    Sig: As Needed.     Reason for Discontinue: Anesthesia Stop    glycopyrrolate (ROBINUL) injection (Discontinued)  As Needed 10/14/2019 10/14/2019    Sig - Route: Infuse  into a venous catheter As Needed. - Intravenous    Reason for Discontinue: Anesthesia Stop    ipratropium-albuterol (DUO-NEB) nebulizer solution 3 mL (Discontinued) 3 mL Once As Needed 10/14/2019 10/14/2019    Sig - Route: Take 3 mL by nebulization 1 (One) Time As Needed for Wheezing or Shortness of Air (bronchospasm). - Nebulization    Reason for Discontinue: Patient Transfer    ketorolac (TORADOL) injection (Discontinued)  As Needed 10/14/2019 10/14/2019    Sig: As Needed.    Reason for Discontinue: Anesthesia Stop    labetalol (NORMODYNE,TRANDATE) injection 5 mg (Discontinued) 5 mg Every 5 Minutes PRN 10/14/2019 10/14/2019    Sig - Route: Infuse 1 mL into a venous catheter Every 5 (Five) Minutes As Needed for High Blood Pressure (for systolic blood pressure greater than 180 mmHg or diastolic blood pressure greater than 105 mmHg). - Intravenous    Reason for Discontinue: Patient Transfer    lactated ringers infusion 1,000 mL (Discontinued) 1,000 mL Continuous 10/14/2019 10/15/2019    Sig - Route: Infuse 1,000 mL into a venous catheter Continuous. - Intravenous    Cosign for Ordering: Required by Luc Hendrickson MD    lactated ringers infusion (Discontinued) 100 mL/hr Continuous 10/14/2019 10/15/2019    Sig - Route: Infuse 100 mL/hr into a venous catheter Continuous. - Intravenous    lidocaine (XYLOCAINE) 1 % injection (Discontinued)  As Needed 10/14/2019 10/14/2019    Sig: As Needed.    Reason for Discontinue: Patient Discharge    lidocaine (XYLOCAINE) 2% injection (Discontinued)  As Needed 10/14/2019 10/14/2019    Sig - Route: Inject  as directed As Needed. - Injection    Reason for Discontinue: Anesthesia Stop    midazolam (VERSED) injection 1 mg (Discontinued) 1 mg Every 5 Minutes PRN 10/14/2019 10/14/2019    Sig - Route: Infuse 1 mL into a venous catheter Every  "5 (Five) Minutes As Needed (Mild to Moderate Anxiety). - Intravenous    Reason for Discontinue: Patient Transfer    Linked Group 1:  \"Or\" Linked Group Details        midazolam (VERSED) injection 2 mg (Discontinued) 2 mg Every 5 Minutes PRN 10/14/2019 10/14/2019    Sig - Route: Infuse 2 mL into a venous catheter Every 5 (Five) Minutes As Needed (Moderate to Severe Anxiety). - Intravenous    Reason for Discontinue: Patient Transfer    Linked Group 1:  \"Or\" Linked Group Details        naloxone (NARCAN) injection 0.4 mg (Discontinued) 0.4 mg As Needed 10/14/2019 10/14/2019    Sig - Route: Infuse 1 mL into a venous catheter As Needed for Opioid Reversal (unresponsiveness, decrease oxygen saturation). - Intravenous    Reason for Discontinue: Patient Transfer    ondansetron (ZOFRAN) injection (Discontinued)  As Needed 10/14/2019 10/14/2019    Sig - Route: Infuse  into a venous catheter As Needed. - Intravenous    Reason for Discontinue: Anesthesia Stop    Propofol (DIPRIVAN) injection (Discontinued)  As Needed 10/14/2019 10/14/2019    Sig: As Needed.    Reason for Discontinue: Anesthesia Stop    rocuronium (ZEMURON) injection (Discontinued)  As Needed 10/14/2019 10/14/2019    Sig - Route: Infuse  into a venous catheter As Needed. - Intravenous    Reason for Discontinue: Anesthesia Stop    sodium chloride (NS) irrigation solution (Discontinued)  As Needed 10/14/2019 10/14/2019    Sig: As Needed.    Reason for Discontinue: Patient Discharge    sodium chloride 0.9 % flush 10 mL (Discontinued) 10 mL As Needed 10/14/2019 10/14/2019    Sig - Route: Infuse 10 mL into a venous catheter As Needed for Line Care. - Intravenous    Reason for Discontinue: Patient Transfer    Cosign for Ordering: Required by Luc Hendrickson MD    sodium chloride 0.9 % flush 3 mL (Discontinued) 3 mL As Needed 10/14/2019 10/14/2019    Sig - Route: Infuse 3 mL into a venous catheter As Needed for Line Care. - Intravenous    Reason for Discontinue: " Patient Transfer    Cosign for Ordering: Required by Luc Hendirckson MD    sodium chloride 0.9 % flush 3 mL (Discontinued) 3 mL Every 12 Hours Scheduled 10/14/2019 10/14/2019    Sig - Route: Infuse 3 mL into a venous catheter Every 12 (Twelve) Hours. - Intravenous    Reason for Discontinue: Patient Transfer    sodium chloride 0.9 % flush 3-10 mL (Discontinued) 3-10 mL As Needed 10/14/2019 10/14/2019    Sig - Route: Infuse 3-10 mL into a venous catheter As Needed for Line Care. - Intravenous    Reason for Discontinue: Patient Transfer    succinylcholine (ANECTINE) injection (Discontinued)  As Needed 10/14/2019 10/14/2019    Sig - Route: Infuse  into a venous catheter As Needed. - Intravenous    Reason for Discontinue: Anesthesia Stop    sugammadex (BRIDION) injection (Discontinued)  As Needed 10/14/2019 10/14/2019    Sig: As Needed.    Reason for Discontinue: Anesthesia Stop            Assessment/Plan  POD 1 from laparoscopic sleeve gastrectomy. Patient is doing well. She may get to go home today if tolerating clear liquids, pain remains controlled, and if Dr. Luc Hendrickson is in agreement.       Class 3 severe obesity due to excess calories with serious comorbidity and body mass index (BMI) of 45.0 to 49.9 in adult (CMS/[facility])    Status post laparoscopic sleeve gastrectomy      GRACIELA Arreguin  10/15/19  8:02 AM

## 2019-10-16 LAB
CYTO UR: NORMAL
LAB AP CASE REPORT: NORMAL
PATH REPORT.FINAL DX SPEC: NORMAL
PATH REPORT.GROSS SPEC: NORMAL

## 2019-10-16 NOTE — DISCHARGE SUMMARY
Date of Discharge:  10/16/2019    Discharge Diagnosis: Class 3 severe obesity due to excess calories with serious comorbidity and body mass index (BMI) of 45.0 to 49.9 in adult (CMS/[facility]) [E66.01, Z68.42]  Class 3 severe obesity due to excess calories with serious comorbidity and body mass index (BMI) of 45.0 to 49.9 in adult (CMS/[facility]) [E66.01, Z68.42]  Class 3 severe obesity due to excess calories with body mass index (BMI) of 45.0 to 49.9 in adult (CMS/HCC) [E66.01, Z68.42]    Presenting Problem/History of Present Illness  Class 3 severe obesity due to excess calories with serious comorbidity and body mass index (BMI) of 45.0 to 49.9 in adult (CMS/HCC) [E66.01, Z68.42]  Class 3 severe obesity due to excess calories with serious comorbidity and body mass index (BMI) of 45.0 to 49.9 in adult (CMS/HCC) [E66.01, Z68.42]  Class 3 severe obesity due to excess calories with body mass index (BMI) of 45.0 to 49.9 in adult (CMS/[facility]) [E66.01, Z68.42]       Hospital Course  Patient is a 44 y.o. female presented with morbid obesity.  She status post laparoscopic sleeve gastrectomy.  Postoperatively the patient has remained stable.  She continued to ambulate, utilize her incentive spirometer and her pain was adequately controlled.  She was discharged home in a stable condition.    A follow-up phone call was made on October 16, 2019 to check on the patient and in standard fashion.  The patient states her pain is adequately controlled, she is also going to start her Lovenox dose at 9 AM which I do recommend and she is clear on following the postoperative instructions as directed.  I also recommend that she record her fluid intake daily because she will be receiving an another phone call to follow-up on her postoperative fluid intake.        Procedures Performed  Procedure(s):  GASTRIC SLEEVE LAPAROSCOPIC       Consults:   Consults     No orders found from 9/15/2019 to 10/15/2019.            Condition on Discharge:   "Stable    Vital Signs  /61 (BP Location: Left arm, Patient Position: Sitting)   Pulse 63   Temp 98.1 °F (36.7 °C)   Resp 16   Ht 162.6 cm (64\")   Wt 113 kg (250 lb)   LMP 10/04/2019   SpO2 98%   Breastfeeding? No   BMI 42.91 kg/m²     Physical Exam:  General Appearance: alert, appears stated age and cooperative  Lungs: clear to auscultation, respirations regular, respirations even and respirations unlabored  Heart: regular rhythm & normal rate, normal S1, S2, no murmur, no gallop, no rub and no click  Abdomen: normal bowel sounds, no masses, no hepatomegaly, no splenomegaly, no guarding and no rebound tenderness, tender  RUQ  Extremities: moves extremities well, no edema, no cyanosis and no redness    Discharge Disposition  Home or Self Care    Discharge Medications     Discharge Medications      New Medications      Instructions Start Date   HYDROcodone-acetaminophen 7.5-325 MG/15ML solution  Commonly known as:  HYCET   15 mL, Oral, Every 6 Hours PRN      ondansetron ODT 4 MG disintegrating tablet  Commonly known as:  ZOFRAN-ODT   4 mg, Oral, Every 8 Hours PRN      simethicone 80 MG chewable tablet  Commonly known as:  GAS-X   40 mg, Oral, Every 6 Hours PRN         Changes to Medications      Instructions Start Date   hydroCHLOROthiazide 25 MG tablet  Commonly known as:  HYDRODIURIL  What changed:  how much to take   12.5 mg, Oral, Daily         Continue These Medications      Instructions Start Date   ALLERGY 10 MG tablet  Generic drug:  loratadine   10 mg, Oral, Daily      enoxaparin 40 MG/0.4ML solution syringe  Commonly known as:  LOVENOX   40 mg, Subcutaneous, Every 24 Hours Scheduled, Inject 40 mg subcutaneously every 24 hours for 13 days after surgery (10-04-19 to 10-17-19)      FEROSUL 325 (65 FE) MG tablet  Generic drug:  ferrous sulfate   1 tablet, Oral, Daily      MULTIVITAMIN ADULT chewable tablet   1 tablet, Oral, Daily      venlafaxine 75 MG tablet  Commonly known as:  EFFEXOR   75 mg, " Oral, Daily         Stop These Medications    LIPITOR 10 MG tablet  Generic drug:  atorvastatin     metFORMIN 500 MG tablet  Commonly known as:  GLUCOPHAGE            Discharge Diet:     Activity at Discharge:     Follow-up Appointments  Future Appointments   Date Time Provider Department Center   10/22/2019 10:30 AM Luc Hendrickson MD [facility] None   11/18/2019  9:30 AM Luc Hendrickson MD [facility] None   1/20/2020 10:00 AM Kristin Mary, GRACIELA [facility] None   1/30/2020  9:00 AM Lee Ann Elias, GRACIELA [facility] None   4/20/2020  9:30 AM Kristin Mayr, GRACIELA [facility] None   10/15/2020  9:30 AM Kristin Mary, GRACIELA [facility] None         Test Results Pending at Discharge   Order Current Status    Tissue Pathology Exam In process           Luc Hendrickson MD  10/16/19  8:39 AM

## 2019-10-22 ENCOUNTER — OFFICE VISIT (OUTPATIENT)
Dept: BARIATRICS/WEIGHT MGMT | Facility: CLINIC | Age: 44
End: 2019-10-22

## 2019-10-22 VITALS
HEIGHT: 65 IN | TEMPERATURE: 97.5 F | BODY MASS INDEX: 40.05 KG/M2 | DIASTOLIC BLOOD PRESSURE: 73 MMHG | OXYGEN SATURATION: 100 % | SYSTOLIC BLOOD PRESSURE: 126 MMHG | HEART RATE: 78 BPM | WEIGHT: 240.4 LBS

## 2019-10-22 DIAGNOSIS — E66.01 CLASS 3 SEVERE OBESITY DUE TO EXCESS CALORIES WITH SERIOUS COMORBIDITY AND BODY MASS INDEX (BMI) OF 45.0 TO 49.9 IN ADULT (HCC): ICD-10-CM

## 2019-10-22 DIAGNOSIS — Z98.84 STATUS POST LAPAROSCOPIC SLEEVE GASTRECTOMY: Primary | ICD-10-CM

## 2019-10-22 PROCEDURE — 99024 POSTOP FOLLOW-UP VISIT: CPT | Performed by: SURGERY

## 2019-10-22 NOTE — PROGRESS NOTES
"Subjective   Kristine Veloz is a 44 y.o. female.     Kristine is post op 1 week from a sleeve gastrectomy procedure.  She is currently on her stage II diet.  Patient states she is consuming over 32 ounces of water and 48 g of protein daily.  She states her pain is adequately controlled and she is doing well overall.    Review Of Systems:  General ROS: negative  Respiratory ROS: no cough, shortness of breath, or wheezing  Cardiovascular ROS: no chest pain or dyspnea on exertion  Gastrointestinal ROS: no abdominal pain, change in bowel habits, or black or bloody stools    The following portions of the patient's history were reviewed and updated as appropriate: allergies, current medications, past medical history, past surgical history and problem list.    Objective   /73 (BP Location: Left arm, Patient Position: Sitting, Cuff Size: Adult)   Pulse 78   Temp 97.5 °F (36.4 °C)   Ht 165.1 cm (65\")   Wt 109 kg (240 lb 6.4 oz)   LMP 10/04/2019   SpO2 100%   BMI 40.00 kg/m²     General Appearance:  awake, alert, oriented, in no acute distress  Lungs:  Normal expansion.  Clear to auscultation.  No rales, rhonchi, or wheezing.  Heart:  Heart sounds are normal.  Regular rate and rhythm without murmur, gallop or rub.  Abdomen:  Soft, non-tender, normal bowel sounds; no bruits, organomegaly or masses.  Abnormal shape: obese  Extremities: Extremities warm to touch, pink, with no edema.  Wounds: clean, dry, intact    Assessment/Plan     Encounter Diagnoses   Name Primary?   • Status post laparoscopic sleeve gastrectomy Yes   • Class 3 severe obesity due to excess calories with serious comorbidity and body mass index (BMI) of 45.0 to 49.9 in adult (CMS/HCC)         The patient and I discussed their weight restrictions which is defined as avoid lifting greater than 15 lbs for at least 4 weeks to allow healing of her tissue.  I also discussed the avoidance of driving or operating a motor vehicle if she requires, " needs taking pain medication, or has a distracting injury or pain because of the risk of injuring herself or others.    10/22/19  11:58 AM  Patient Care Team:  Tere Mcgill MD as PCP - General (Internal Medicine)  Emiliana Murphy MA as Medical Assistant  Luc Hendrickson MD FACS

## 2019-10-25 ENCOUNTER — TELEPHONE (OUTPATIENT)
Dept: BARIATRICS/WEIGHT MGMT | Facility: CLINIC | Age: 44
End: 2019-10-25

## 2019-10-25 NOTE — TELEPHONE ENCOUNTER
Patient called stating that she has started taking her multivitamin and has been having diarrhea 30-60 minutes after taking the vitamin. She is currently on stage 2 post op diet following sleeve procedure. I advised her to stop taking the multivitamin giving her diarrhea and to switch back to her gummies for the time being to see if that helped. I also told her she may be able to take the multivitamin at a later date.    GRACIELA Arreguin

## 2019-11-18 ENCOUNTER — OFFICE VISIT (OUTPATIENT)
Dept: BARIATRICS/WEIGHT MGMT | Facility: CLINIC | Age: 44
End: 2019-11-18

## 2019-11-18 VITALS
SYSTOLIC BLOOD PRESSURE: 124 MMHG | WEIGHT: 226.8 LBS | HEART RATE: 77 BPM | DIASTOLIC BLOOD PRESSURE: 77 MMHG | BODY MASS INDEX: 37.79 KG/M2 | TEMPERATURE: 97.8 F | OXYGEN SATURATION: 97 % | HEIGHT: 65 IN

## 2019-11-18 DIAGNOSIS — E66.01 CLASS 3 SEVERE OBESITY DUE TO EXCESS CALORIES WITH SERIOUS COMORBIDITY AND BODY MASS INDEX (BMI) OF 45.0 TO 49.9 IN ADULT (HCC): ICD-10-CM

## 2019-11-18 DIAGNOSIS — Z98.84 STATUS POST LAPAROSCOPIC SLEEVE GASTRECTOMY: Primary | ICD-10-CM

## 2019-11-18 PROCEDURE — 99024 POSTOP FOLLOW-UP VISIT: CPT | Performed by: SURGERY

## 2019-11-18 NOTE — PROGRESS NOTES
"Subjective   Kristine Veloz is a 44 y.o. female.     Kristine is post op 1 month from a sleeve gastrectomy procedure.  She is currently on her regular diet.  She states she eats 4 meals a day with portion sizes no greater than a half a cup.  She consumes at least 64 ounces of water and 40 g of protein daily.  She is also incorporated exercise working out 3-4 times a week for 30 minutes.    Review Of Systems:  General ROS: negative  Respiratory ROS: no cough, shortness of breath, or wheezing  Cardiovascular ROS: no chest pain or dyspnea on exertion  Gastrointestinal ROS: no abdominal pain, change in bowel habits, or black or bloody stools    The following portions of the patient's history were reviewed and updated as appropriate: allergies, current medications, past medical history, past surgical history and problem list.    Objective   /77 (BP Location: Right arm, Patient Position: Sitting, Cuff Size: Adult)   Pulse 77   Temp 97.8 °F (36.6 °C)   Ht 165.1 cm (65\")   Wt 103 kg (226 lb 12.8 oz)   SpO2 97%   BMI 37.74 kg/m²     General Appearance:  awake, alert, oriented, in no acute distress  Lungs:  Normal expansion.  Clear to auscultation.  No rales, rhonchi, or wheezing.  Heart:  Heart sounds are normal.  Regular rate and rhythm without murmur, gallop or rub.  Abdomen:  Soft, non-tender, normal bowel sounds; no bruits, organomegaly or masses.  Abnormal shape: obese  Extremities: Extremities warm to touch, pink, with no edema.  Wounds: clean, dry, intact    Assessment/Plan     Encounter Diagnoses   Name Primary?   • Status post laparoscopic sleeve gastrectomy Yes   • Class 3 severe obesity due to excess calories with serious comorbidity and body mass index (BMI) of 45.0 to 49.9 in adult (CMS/[facility])        Kristine Veloz and I discussed the importance of changing behavior for consistent and successful weight loss.  We first reviewed again the definition of a meal which is defined as portion " sizes less than a half a cup and those portions incorporating a protein.  Specifically the protein should fill half of that volume.  I also explained that she should attempt to consume 4 meals as defined above daily and to avoid snacking or grazing.  She should also be mindful of adequate hydration by consuming at least 64 oz of water daily and incorporation of a regular exercise regimen.   I discussed the importance of taking her multivitamins as directed.  She is to return to our office 2 months or as needed.    11/18/19  12:07 PM  Patient Care Team:  Tere Mcgill MD as PCP - General (Internal Medicine)  Emiliana Murphy MA (Inactive) as Medical Assistant  Luc Hendrickson MD FACS

## 2019-12-06 NOTE — TELEPHONE ENCOUNTER
LOC: The patient is awake, alert, and oriented to place, time, situation. Affect is appropriate.  Speech is appropriate and clear.     APPEARANCE: Patient resting comfortably in no acute distress.  Patient is clean and well groomed.    SKIN: The skin is warm and dry; color consistent with ethnicity.  Patient has normal skin turgor and moist mucus membranes.  Skin intact; no breakdown or bruising noted.     MUSCULOSKELETAL: Patient moving upper and lower extremities without difficulty.  Denies weakness. Pt reporting increased swelling to the bilateral lower extremities.    RESPIRATORY: Airway is open and patent. Respirations spontaneous, even, easy, and non-labored.  Patient has a normal effort and rate.  No accessory muscle use noted. Denies cough.     CARDIAC:  Normal rhythm and rate noted.  No peripheral edema noted. No complaints of chest pain.      ABDOMEN: Soft and non tender to palpation.  No distention noted.     NEUROLOGIC: Eyes open spontaneously.  Behavior appropriate to situation.  Follows commands; facial expression symmetrical.  Purposeful motor response noted; normal sensation in all extremities.         She called to get lab results-said she went to lab few weeks and can be reached at [phone]

## 2020-01-20 ENCOUNTER — OFFICE VISIT (OUTPATIENT)
Dept: BARIATRICS/WEIGHT MGMT | Facility: CLINIC | Age: 45
End: 2020-01-20

## 2020-01-20 VITALS
DIASTOLIC BLOOD PRESSURE: 84 MMHG | OXYGEN SATURATION: 98 % | TEMPERATURE: 99.3 F | HEART RATE: 72 BPM | WEIGHT: 217.4 LBS | BODY MASS INDEX: 36.22 KG/M2 | SYSTOLIC BLOOD PRESSURE: 121 MMHG | HEIGHT: 65 IN

## 2020-01-20 DIAGNOSIS — E66.01 CLASS 2 SEVERE OBESITY DUE TO EXCESS CALORIES WITH SERIOUS COMORBIDITY AND BODY MASS INDEX (BMI) OF 36.0 TO 36.9 IN ADULT (HCC): Primary | ICD-10-CM

## 2020-01-20 DIAGNOSIS — G47.33 OBSTRUCTIVE SLEEP APNEA SYNDROME: ICD-10-CM

## 2020-01-20 DIAGNOSIS — I10 ESSENTIAL HYPERTENSION: ICD-10-CM

## 2020-01-20 DIAGNOSIS — Z98.84 STATUS POST LAPAROSCOPIC SLEEVE GASTRECTOMY: ICD-10-CM

## 2020-01-20 DIAGNOSIS — E66.9 DIABETES MELLITUS TYPE 2 IN OBESE (HCC): ICD-10-CM

## 2020-01-20 DIAGNOSIS — E11.69 DIABETES MELLITUS TYPE 2 IN OBESE (HCC): ICD-10-CM

## 2020-01-20 PROCEDURE — 99213 OFFICE O/P EST LOW 20 MIN: CPT | Performed by: NURSE PRACTITIONER

## 2020-01-20 NOTE — PROGRESS NOTES
"   Patient Care Team:  Tere Mcgill MD as PCP - General (Internal Medicine)  Emiliana Murphy MA (Inactive) as Medical Assistant    Reason for Visit:  S/P Sleeve Gastrectomy    Subjective      Kristine Veloz is post sleeve gastrectomy bariatric surgery. This is a 3 months follow-up. Patient is able to get 64+ ounces of water in per day. Patient is eating 3-4 meals per day, approximately 1/2 cup in volume, which she does not measure. Patient is able to get 48+ grams of protein in per day. Patient is taking a multiple vitamin, calcium with vitamin D supplement, and vitamin B12 supplement as advised. Patient is exercising by walking, rowing, and doing set ups everyday. Patient does not have any complaints of reflux, dysphagia, N/V, or abdominal pain. She denies obstacles or struggles at this time. She has lost 9 lbs since her 1 month follow up.    Vitals:    01/20/20 0954   BP: 121/84   BP Location: Left arm   Patient Position: Sitting   Cuff Size: Adult   Pulse: 72   Temp: 99.3 °F (37.4 °C)   SpO2: 98%   Weight: 98.6 kg (217 lb 6.4 oz)   Height: 165.1 cm (65\")         Review of Systems  Negative except the below listed  Endocrine ROS: positive for - hair pattern changes    History  Past Medical History:   Diagnosis Date   • Abnormal thyroid function test 6/1/2018   • Anxiety    • Back pain    • Depression    • Diabetes mellitus (CMS/HCC)    • Hypertension    • Secondary hyperparathyroidism, non-renal (CMS/HCC) 6/1/2018   • Sleep apnea     CPAP    • Vitamin D deficiency 6/1/2018     Past Surgical History:   Procedure Laterality Date   • CHOLECYSTECTOMY  08/2008    Lap    • ENDOSCOPY N/A 9/3/2019    Procedure: ESOPHAGOGASTRODUODENOSCOPY WITH ANESTHESIA;  Surgeon: Luc Hendrickson MD;  Location: [facility] ENDOSCOPY;  Service: General   • GASTRIC SLEEVE LAPAROSCOPIC N/A 10/14/2019    Procedure: GASTRIC SLEEVE LAPAROSCOPIC;  Surgeon: Luc Hendrickson MD;  Location: [facility] OR;  Service: Bariatric "     Family History   Problem Relation Age of Onset   • Arthritis Mother    • Heart disease Mother    • Heart disease Father    • Arthritis Maternal Grandmother    • Diabetes Maternal Grandmother    • Heart disease Maternal Grandmother    • Arthritis Maternal Grandfather    • Arthritis Paternal Grandfather    • Thyroid disease Neg Hx      Social History     Tobacco Use   • Smoking status: Never Smoker   • Smokeless tobacco: Never Used   Substance Use Topics   • Alcohol use: No   • Drug use: No       (Not in a hospital admission)  Allergies:  Penicillins      Current Outpatient Medications:   •  loratadine (ALLERGY) 10 MG tablet, Take 10 mg by mouth Daily., Disp: , Rfl:   •  Multiple Vitamins-Minerals (MULTIVITAMIN ADULT) chewable tablet, Chew 1 tablet Daily., Disp: , Rfl:   •  venlafaxine (EFFEXOR) 75 MG tablet, Take 75 mg by mouth Daily., Disp: , Rfl:   •  FEROSUL 325 (65 Fe) MG tablet, Take 1 tablet by mouth Daily., Disp: , Rfl:     Objective     Vital Signs  Temp:  [99.3 °F (37.4 °C)] 99.3 °F (37.4 °C)  Heart Rate:  [72] 72  BP: (121)/(84) 121/84  Body mass index is 36.18 kg/m².      01/20/20  0954   Weight: 98.6 kg (217 lb 6.4 oz)       Physical Exam:  HEENT: extra ocular movement intact  Respiratory: appears well, vitals normal, no respiratory distress, acyanotic, normal RR, chest clear, no wheezing, crepitations, rhonchi, normal symmetric air entry  Cardiovascular: Regular rate and rhythm, S1, S2 normal, no murmur, click, rub or gallop  GI: Soft, non-tender, normal bowel sounds; no bruits, organomegaly or masses.  Abnormal shape: obese  Skin: scars- lap sites healed well. No hernias noted.  Musculoskeletal: inspection - no abnormality, range of motion normal  Neurologic: alert, oriented, normal speech, no focal findings or movement disorder noted       Results Review:   None        Assessment/Plan   Encounter Diagnoses   Name Primary?   • Class 2 severe obesity due to excess calories with serious comorbidity  and body mass index (BMI) of 36.0 to 36.9 in adult (CMS/[facility]) Yes   • Status post laparoscopic sleeve gastrectomy    • Essential hypertension    • Obstructive sleep apnea syndrome    • Diabetes mellitus type 2 in obese (CMS/[facility])          1. Kristine Veloz and I discussed the importance of changing behavior for consistent and successful weight loss long term.  We first reviewed again the definition of a meal which is defined as portion sizes less than a half a cup and those portions incorporating a protein.  Specifically the protein should fill half of that volume.  I also explained that she should attempt to consume 4-5 meals as defined above daily and to avoid snacking or grazing.  She should also continue to be mindful of adequate hydration by consuming at least 64 oz of water daily, without eating and drinking simultaneously, and continue to incorporate a regular exercise regimen. I discussed the importance of taking her multivitamins as directed. She was given 3 months education handout.     Goals: measure every meal as defined above and continue to incorporate s/p gastric sleeve dietary and lifestyle recommendations. Patient was encouraged to call with questions, concerns, or obstacles as they may arise prior to next appointment. Support group attendance encouraged.    2. Current comorbid conditions of hypertension, ROGER, and diabetes associated with her morbid obesity are reported to be stable on her current treatment regimen and medications. We anticipate the comorbid conditions to improve as we address her morbid obesity.     She is to return to our office 3 months or as needed.    GRACIELA Pak    01/20/20  10:42 AM  Patient Care Team:  Tere Mcgill MD as PCP - General (Internal Medicine)  Emiliana Murphy MA (Inactive) as Medical Assistant

## 2020-02-03 ENCOUNTER — OFFICE VISIT (OUTPATIENT)
Dept: SLEEP MEDICINE | Facility: HOSPITAL | Age: 45
End: 2020-02-03

## 2020-02-03 VITALS
HEART RATE: 84 BPM | SYSTOLIC BLOOD PRESSURE: 114 MMHG | BODY MASS INDEX: 36.15 KG/M2 | WEIGHT: 217 LBS | OXYGEN SATURATION: 97 % | DIASTOLIC BLOOD PRESSURE: 77 MMHG | HEIGHT: 65 IN

## 2020-02-03 DIAGNOSIS — G47.33 OBSTRUCTIVE SLEEP APNEA, ADULT: Primary | ICD-10-CM

## 2020-02-03 PROCEDURE — 99213 OFFICE O/P EST LOW 20 MIN: CPT | Performed by: NURSE PRACTITIONER

## 2020-02-03 NOTE — PROGRESS NOTES
Sleep Clinic Follow Up    Date: 2/3/2020  Primary Care Physician: Tere Mcgill MD    Last office visit: 2019 (I reviewed this note)    CC: Follow up: ROGER on CPAP      Interim History:  Since the last visit:    1) mild ROGER -  Kristine Veloz has not remained compliant with CPAP. She denies mask and machine issues, dry mouth, headaches, or pressures intolerance. She denies abnormal dreams, sleep paralysis, nasal congestion, URI sx. She has not been using her machine for about 6 months due to being in and out of the hospital.    Sleep Testin. HST on 10/23/2018, AHI of 10.5   2. Currently on 5-20 cm H2O    PAP Data:    Time frame: 2019-2020  Compliance 20.1 %  Average use on days used: 3 hrs 48 min  Percent of days with usage greater than or equal to 4 hours: 10.2%  PAP range : 5-20 cm H2O  Average 90% pressure: 12.3 cmH2O  Leak: 0 minutes  Average AHI 2.5 events/hr  Mask type: Nasal mask  DME: [facility]    Bed time: 2200  Sleep latency: 15-30 minutes  Number of times awakens during the night: 1  Wake time: 0630  Estimated total sleep time at night: 7 hours  Caffeine intake: 1 cups of coffee, 0 cups of tea, and 0 sodas per day  Alcohol intake: 0 drinks per week  Nap time: none   Sleepiness with Driving: none     [place] - 6    2) Patient denies RLS symptoms.     PMHx, FH, SH reviewed and pertinent changes are: Bariatric sleeve with about ~60 lbs weight loss so far.       REVIEW OF SYSTEMS:   Negative for chest pain, SOA, fever, chills, cough, N/V/D, abdominal pain.    Smoking:none      Exam:  Vitals:    20 09   BP: 114/77   Pulse: 84   SpO2: 97%           20   Weight: 98.4 kg (217 lb)     Body mass index is 36.11 kg/m². Patient's Body mass index is 36.11 kg/m². BMI is above normal parameters. Recommendations include: referral to primary care.      Gen:                No distress, conversant, pleasant, appears stated age, alert, oriented  Eyes:                Anicteric sclera, moist conjunctiva, no lid lag                           PERRL, EOMI   Heent:             NC/AT                          Oropharynx clear                          Normal hearing  Lungs:             Normal effort, non-labored breathing                          Clear to auscultation bilaterally          CV:                  Normal S1/S2, no murmur                          No lower extremity edema  ABD:               Soft, rounded, non-distended                          Normal bowel sounds                    Psych:             Appropriate affect  Neuro:             CN 2-12 appear intact    Past Medical History:   Diagnosis Date   • Abnormal thyroid function test 6/1/2018   • Anxiety    • Back pain    • Depression    • Diabetes mellitus (CMS/HCC)    • Hypertension    • Secondary hyperparathyroidism, non-renal (CMS/HCC) 6/1/2018   • Sleep apnea     CPAP    • Vitamin D deficiency 6/1/2018       Current Outpatient Medications:   •  FEROSUL 325 (65 Fe) MG tablet, Take 1 tablet by mouth Daily., Disp: , Rfl:   •  loratadine (ALLERGY) 10 MG tablet, Take 10 mg by mouth Daily., Disp: , Rfl:   •  Multiple Vitamins-Minerals (MULTIVITAMIN ADULT) chewable tablet, Chew 1 tablet Daily., Disp: , Rfl:   •  venlafaxine (EFFEXOR) 75 MG tablet, Take 75 mg by mouth Daily., Disp: , Rfl:       Assessment and Plan:    1. Obstructive sleep apnea Established, stable (1)  1. Compliant with PAP therapy  2. Continue PAP as prescribed  3. Script for PAP supplies  4. Discussed potential need for retesting in the future since weight loss  5. Decrease pressure to 5-10 cm H2O for now  6. Return to clinic in 1 year with compliance report unless change in symptoms in interim period        10 of 20 minutes spent face-to-face counseling patient extensively regarding:   PAP therapy, PAP compliance and PAP maintenance      RTC in 12 months. Patient agrees to return sooner if changes in symptoms.        This document has been electronically  signed by GRACIELA Connors on February 3, 2020 9:06 AM          CC: Tere Mcgill MD          No ref. provider found

## 2020-02-26 ENCOUNTER — TELEPHONE (OUTPATIENT)
Dept: BARIATRICS/WEIGHT MGMT | Facility: CLINIC | Age: 45
End: 2020-02-26

## 2020-04-22 VITALS — WEIGHT: 208 LBS | BODY MASS INDEX: 34.66 KG/M2 | HEIGHT: 65 IN

## 2020-04-23 ENCOUNTER — TELEMEDICINE (OUTPATIENT)
Dept: BARIATRICS/WEIGHT MGMT | Facility: CLINIC | Age: 45
End: 2020-04-23

## 2020-04-23 DIAGNOSIS — E66.9 DIABETES MELLITUS TYPE 2 IN OBESE (HCC): ICD-10-CM

## 2020-04-23 DIAGNOSIS — G47.33 OBSTRUCTIVE SLEEP APNEA SYNDROME: ICD-10-CM

## 2020-04-23 DIAGNOSIS — E11.69 DIABETES MELLITUS TYPE 2 IN OBESE (HCC): ICD-10-CM

## 2020-04-23 DIAGNOSIS — Z98.84 STATUS POST LAPAROSCOPIC SLEEVE GASTRECTOMY: ICD-10-CM

## 2020-04-23 DIAGNOSIS — I10 ESSENTIAL HYPERTENSION: ICD-10-CM

## 2020-04-23 DIAGNOSIS — E66.09 CLASS 1 OBESITY DUE TO EXCESS CALORIES WITH SERIOUS COMORBIDITY AND BODY MASS INDEX (BMI) OF 34.0 TO 34.9 IN ADULT: Primary | ICD-10-CM

## 2020-04-23 PROCEDURE — 99422 OL DIG E/M SVC 11-20 MIN: CPT | Performed by: NURSE PRACTITIONER

## 2020-04-23 NOTE — PROGRESS NOTES
"   Patient Care Team:  Tere Mcgill MD as PCP - General (Internal Medicine)  Emiliana Murphy MA (Inactive) as Medical Assistant    Reason for Visit:  S/P Sleeve Gastrectomy    Type of Visit: Video Visit  Provider Location: [facility]  Patient Location: Private Vehicle      Subjective      Kristine Veloz is post sleeve gastrectomy bariatric surgery. This is a 6 month follow-up. Patient is able to get 64 ounces of water in per day. Patient is eating 4 meals per day, approximately 1/2 cup in volume, which she states she measures. Patient is able to get 50-60 grams of protein in per day. Patient is taking a multiple vitamin and  vitamin D supplement supplement as advised. Patient is exercising by walking 34 times per week. Patient does not have any complaints of reflux, dysphagia, N/V, or abdominal pain. Denies drinking carbonated beverage and the use of nicotine, NSAIDS, or steroids.    Vitals:    04/22/20 1451   Weight: 94.3 kg (208 lb)   Height: 165.1 cm (65\")         Review of Systems  Negative except the below listed  Ophthalmic ROS: positive for - uses glasses    History  Past Medical History:   Diagnosis Date   • Abnormal thyroid function test 6/1/2018   • Anxiety    • Back pain    • Depression    • Diabetes mellitus (CMS/HCC)    • Hypertension    • Secondary hyperparathyroidism, non-renal (CMS/HCC) 6/1/2018   • Sleep apnea     CPAP    • Vitamin D deficiency 6/1/2018     Past Surgical History:   Procedure Laterality Date   • CHOLECYSTECTOMY  08/2008    Lap    • ENDOSCOPY N/A 9/3/2019    Procedure: ESOPHAGOGASTRODUODENOSCOPY WITH ANESTHESIA;  Surgeon: Luc Hendrickson MD;  Location: [facility] ENDOSCOPY;  Service: General   • GASTRIC SLEEVE LAPAROSCOPIC N/A 10/14/2019    Procedure: GASTRIC SLEEVE LAPAROSCOPIC;  Surgeon: Luc Hendrickson MD;  Location: [facility] OR;  Service: Bariatric   • HYSTERECTOMY  2020     Family History   Problem Relation Age of Onset   • Arthritis Mother  "   • Heart disease Mother    • Heart disease Father    • Arthritis Maternal Grandmother    • Diabetes Maternal Grandmother    • Heart disease Maternal Grandmother    • Arthritis Maternal Grandfather    • Arthritis Paternal Grandfather    • Thyroid disease Neg Hx      Social History     Tobacco Use   • Smoking status: Never Smoker   • Smokeless tobacco: Never Used   Substance Use Topics   • Alcohol use: No   • Drug use: No       (Not in a hospital admission)  Allergies:  Penicillins      Current Outpatient Medications:   •  loratadine (ALLERGY) 10 MG tablet, Take 10 mg by mouth Daily., Disp: , Rfl:   •  Multiple Vitamins-Minerals (MULTIVITAMIN ADULT) chewable tablet, Chew 1 tablet Daily., Disp: , Rfl:   •  venlafaxine (EFFEXOR) 75 MG tablet, Take 75 mg by mouth Daily., Disp: , Rfl:   •  VITAMIN D PO, Take  by mouth., Disp: , Rfl:   •  FEROSUL 325 (65 Fe) MG tablet, Take 1 tablet by mouth Daily., Disp: , Rfl:     Objective     Vital Signs     Body mass index is 34.61 kg/m².      04/22/20  1451   Weight: 94.3 kg (208 lb)       Physical Exam:  HEENT: extra ocular movement intact  Respiratory: appears well, no respiratory distress, acyanotic, normal RR  GI: Abnormal shape: obese  Musculoskeletal: range of motion normal  Neurologic: alert, oriented, normal speech, no focal findings or movement disorder noted       Results Review:   None        Assessment/Plan   Encounter Diagnoses   Name Primary?   • Class 1 obesity due to excess calories with serious comorbidity and body mass index (BMI) of 34.0 to 34.9 in adult Yes   • Status post laparoscopic sleeve gastrectomy    • Essential hypertension    • Obstructive sleep apnea syndrome    • Diabetes mellitus type 2 in obese (CMS/[facility])          1. Kristine Veloz and I discussed the importance of changing behavior for consistent and successful weight loss long term.  We first reviewed again the definition of a meal which is defined as portion sizes less than a half a cup and  those portions incorporating a protein.  Specifically the protein should fill half of that volume.  I also explained that she should attempt to consume 4-5 meals as defined above daily and to avoid snacking or grazing.  She should also continue to be mindful of adequate hydration by consuming at least 64 oz of water daily, without eating and drinking simultaneously, and continue to incorporate a regular exercise regimen. I discussed the importance of taking her multivitamins as directed. She was emailed the 6 month education handout.     Goals: continue to incorporate s/p gastric sleeve dietary and lifestyle recommendations. Patient was encouraged to call with questions, concerns, or obstacles as they may arise prior to next appointment. Support group attendance encouraged.    2. Current comorbid conditions of hypertension, ROGER, and diabetes associated with her morbid obesity are reported to be stable on her current treatment regimen and medications. She is no longer requiring medications to manage her BP and diabetes since her weight loss surgery. We anticipate the comorbid conditions to improve as we address her morbid obesity.     A total of 15 minutes was spent face to face with this patient on video and over half of the time was spent on counseling and coordination of care for the disease of obesity. We specifically reviewed the post op gastric sleeve dietary and lifestyle behaviors.    She is to return to our office 6 months or as needed.    GRACIELA Pak    04/23/20  11:27  Patient Care Team:  Tere Mcgill MD as PCP - General (Internal Medicine)  Emiliana Murphy MA (Inactive) as Medical Assistant

## 2020-08-17 ENCOUNTER — TELEPHONE (OUTPATIENT)
Dept: BARIATRICS/WEIGHT MGMT | Facility: CLINIC | Age: 45
End: 2020-08-17

## 2020-08-17 NOTE — TELEPHONE ENCOUNTER
S/p 10/14/2019    Patient called regarding a high cholesterol result. PCP put her on a cholesterol medicine but is concerned as to why her level is high. I asked about her meals and she has been eating a lot of shrimp and fish. She is going to review her meals. She would like to speak with FELIPA OWENS.

## 2020-08-19 ENCOUNTER — TELEPHONE (OUTPATIENT)
Dept: BARIATRICS/WEIGHT MGMT | Facility: CLINIC | Age: 45
End: 2020-08-19

## 2020-08-19 NOTE — TELEPHONE ENCOUNTER
Returned patients call concerning recent labs by her PCP showing elevated cholesterol. She states she has a family history of elevated cholesterol but no personally. She states she has been eating a lot of shrimp, fish, and not as much red meat. She does admit to eating pepperoni on the weekends and some dairy products. I have advised her to contact her PCP for further explanation on her LDL and HDL specifically. I have also advised her to limit intake of saturated and transfats. She states understanding.    GRACIELA Arreguin

## 2020-11-12 ENCOUNTER — NUTRITION (OUTPATIENT)
Dept: BARIATRICS/WEIGHT MGMT | Facility: HOSPITAL | Age: 45
End: 2020-11-12

## 2020-11-12 VITALS — BODY MASS INDEX: 36.39 KG/M2 | HEIGHT: 65 IN | WEIGHT: 218.4 LBS

## 2020-11-12 NOTE — PROGRESS NOTES
"Nutrition Services    Patient Name:  Kristine Veloz  YOB: 1975  MRN: [id number]  Admit Date:  (Not on file)    NUTRITION BARIATRIC/M[facility] NOTE     Visit-annual  Previous Sx      Anthropometrics   Height: 65 in  Weight: 218 lbs 6.4 oz  BMI: 36.34    Nutrition Recall  24 Hour recall:  (B)1 sc egg 2pc martines or protein shake  (L)Protein and veggies (A) Tuna and 2 crackers or carrots and celery (D) Chicken or fish and 2 veggies  Eating ___4___ meals daily   Meeting protein daily goal  Snacking-\"doing better\"  Making healthier choices  Limited sweet intake-not often  Monitoring portions:  1/2 to 1 cup meals  Drinking between meals   Drinking greater to or equal to 64 fluid ounces  Replacing ____1______ meal with protein shake daily-Breakfast  Exercise:  Walking and jogging    Education    Meals fit for You    Nutrition Goals   Continue diet changes  Eat __4___ meals per day with protein  Eat protein first at meals  Protein goal: 65gms   discussed protein guidelines for shakes and bar  Eliminate snacks  Healthier food choices  Portion control /  measuring cup   Continue with at least 64 oz of water per day  Drink between meals only. Stop 30 minutes before and start 30 minutes after. Sip only with meals    Exercise Goals  Continue current exercise routine       Electronically signed by:  Stacy Black  11/12/20 13:38 CST   " None

## 2022-01-12 ENCOUNTER — TELEPHONE (OUTPATIENT)
Dept: BARIATRICS/WEIGHT MGMT | Facility: CLINIC | Age: 47
End: 2022-01-12

## 2022-01-12 NOTE — TELEPHONE ENCOUNTER
S/P 10/14/2019    Patient tested positive for Covid and she also has factor 5. Her PCP wants her to take an ASA 81mg daily for 30 days. She wonders if this ok with her Gastric Sleeve. I will ask Dr Hendrickson and call the patient back.       Dr Hendrickson is ok with ASA 81mg daily for 30 days. Patient was informed and agreeable.

## 2022-12-30 ENCOUNTER — TELEPHONE (OUTPATIENT)
Dept: BARIATRICS/WEIGHT MGMT | Facility: CLINIC | Age: 47
End: 2022-12-30

## 2022-12-30 NOTE — TELEPHONE ENCOUNTER
LVM for the patient to call and reschedule a cancelled appt. I also mailed her letter regarding this message.

## 2024-04-23 ENCOUNTER — TELEPHONE (OUTPATIENT)
Dept: BARIATRICS/WEIGHT MGMT | Facility: CLINIC | Age: 49
End: 2024-04-23
Payer: COMMERCIAL

## 2024-04-23 NOTE — TELEPHONE ENCOUNTER
Contacted the patient and she is interested in restarting the program.  We will reach out to her to set up an appointment.

## 2024-05-14 ENCOUNTER — OFFICE VISIT (OUTPATIENT)
Dept: BARIATRICS/WEIGHT MGMT | Facility: CLINIC | Age: 49
End: 2024-05-14
Payer: COMMERCIAL

## 2024-05-14 VITALS
SYSTOLIC BLOOD PRESSURE: 139 MMHG | HEIGHT: 65 IN | OXYGEN SATURATION: 97 % | WEIGHT: 254 LBS | BODY MASS INDEX: 42.32 KG/M2 | DIASTOLIC BLOOD PRESSURE: 86 MMHG | TEMPERATURE: 98.4 F | HEART RATE: 88 BPM

## 2024-05-14 DIAGNOSIS — R12 HEARTBURN: Primary | ICD-10-CM

## 2024-05-14 DIAGNOSIS — F41.9 ANXIETY: ICD-10-CM

## 2024-05-14 DIAGNOSIS — Z98.84 STATUS POST LAPAROSCOPIC SLEEVE GASTRECTOMY: ICD-10-CM

## 2024-05-14 DIAGNOSIS — E66.01 CLASS 3 SEVERE OBESITY DUE TO EXCESS CALORIES WITH SERIOUS COMORBIDITY AND BODY MASS INDEX (BMI) OF 40.0 TO 44.9 IN ADULT: ICD-10-CM

## 2024-05-14 PROCEDURE — 3079F DIAST BP 80-89 MM HG: CPT | Performed by: SURGERY

## 2024-05-14 PROCEDURE — 1159F MED LIST DOCD IN RCRD: CPT | Performed by: SURGERY

## 2024-05-14 PROCEDURE — 1160F RVW MEDS BY RX/DR IN RCRD: CPT | Performed by: SURGERY

## 2024-05-14 PROCEDURE — 3075F SYST BP GE 130 - 139MM HG: CPT | Performed by: SURGERY

## 2024-05-14 PROCEDURE — 99204 OFFICE O/P NEW MOD 45 MIN: CPT | Performed by: SURGERY

## 2024-05-14 RX ORDER — LOSARTAN POTASSIUM 100 MG/1
100 TABLET ORAL DAILY
COMMUNITY

## 2024-05-14 RX ORDER — ATORVASTATIN CALCIUM 10 MG/1
10 TABLET, FILM COATED ORAL DAILY
COMMUNITY

## 2024-05-14 NOTE — PROGRESS NOTES
"  Patient Care Team:  Fredo Meraz APRN as PCP - General (Nurse Practitioner)  Emiliana Murphy MA (Inactive) as Medical Assistant    Subjective     Kristine Veloz is a 49 y.o. female.  She was welcome back into our program.    Kristine is post op approximately 4-1/2 years from her sleeve gastrectomy.  She is currently on her regular diet.  She states that she is not measuring her meals and has not done so for several years specifically 2 years.  She stated during those 2 years she had 1 year of significant ailments secondary to COVID and the flu.  The following year she was recovering from those ailments per her statement.  She was unable to continue her routine of exercise and following the dietary prescription recommendations.  She is here to reestablish treatment of her morbid obesity and to rejoin our program.    Review Of Systems:  General ROS: positive for  - weight gain  Psychological ROS: positive for - anxiety and depression  Respiratory ROS: no cough, shortness of breath, or wheezing  Cardiovascular ROS: no chest pain or dyspnea on exertion  Gastrointestinal ROS: no abdominal pain, change in bowel habits, or black or bloody stools  positive for - heartburn    The following portions of the patient's history were reviewed and updated as appropriate: allergies, current medications, past family history, past medical history, past social history, past surgical history, and problem list.    Objective   /86 (BP Location: Right arm, Patient Position: Sitting, Cuff Size: Adult)   Pulse 88   Temp 98.4 °F (36.9 °C)   Ht 165.1 cm (65\")   Wt 115 kg (254 lb)   SpO2 97%   BMI 42.27 kg/m²       05/14/24  0935   Weight: 115 kg (254 lb)        General Appearance:  awake, alert, oriented, in no acute distress  Lungs:  Normal expansion.  Clear to auscultation.  No rales, rhonchi, or wheezing.  Heart:  Heart regular rate and rhythm  Abdomen:  Soft, non-tender, normal bowel sounds; no bruits, " organomegaly or masses.  Abnormal shape: obese    ASSESSMENT/ PLAN    Encounter Diagnoses   Name Primary?    Status post laparoscopic sleeve gastrectomy Yes    Class 3 severe obesity due to excess calories with serious comorbidity and body mass index (BMI) of 40.0 to 44.9 in adult     Heartburn          Due to her duration and symptoms of heartburn I have recommended she obtain an esophagogastroduodenoscopy with biopsies to assess for any contraindications for surgical weight loss.  I explained the alternatives  include not doing anything, or pursuing an UGI series which only offers a diagnosis with potential less accuracy compared to EGD, the benefits of the EGD such as identifying the pathology and anatomy of the upper GI system, and the risks and complications of the endoscopy were discussed in detail as well. I discussed the risk of perforation (one out of 8989-5366, riskier with dilation), bleeding (one out of 500), and the rare risks of infection, adverse reaction to anesthesia, respiratory failure, cardiac failure including MI and adverse reaction to medications such as allergic reactions. We discussed consequences that could occur if a risk were to develop such as the need for hospitalization, blood transfusion, surgical intervention, medications, pain, disability and death. The patient verbalizes understanding and agrees to proceed. such as bleeding, perforation, swallowing difficulties and gas bloat can occur after this procedure.    Upon completion of our discussion and addressing and answering her questions to her satisfation, informed consent was obtained.  She will be scheduled accordingly for the esophagogastroduodenoscopy procedure.  With respect to her morbid obesity and history I recommended that she continue with our program services which include our educational classes and courses as well as being assessed with our dietitian and nurse practitioner services.  I also expressed my concern of her  anxiety and depression which appears to be long-term and I recommended that she establish behavioral health services locally if available and if not I will also place a consultation for her to visit with one of our known specialists.  She was in agreement.  I discussed the patients findings and my recommendations with patient.     05/14/24  11:14 CDT  Patient Care Team:  Fredo Meraz APRN as PCP - General (Nurse Practitioner)  Emiliana Murphy MA (Inactive) as Medical Assistant  Luc Hendrickson MD FACS

## 2024-07-03 ENCOUNTER — TELEPHONE (OUTPATIENT)
Dept: BARIATRICS/WEIGHT MGMT | Facility: CLINIC | Age: 49
End: 2024-07-03
Payer: COMMERCIAL

## 2024-07-03 NOTE — TELEPHONE ENCOUNTER
VALENTÍN-EGIVETTE       Patient was called and reminded of their procedure with Dr Hendrickson on 09/05/2024        Instructions:     Eat normal the day before your procedure until 8 p.m. in the evening.    Beginning at 6pm clear liquids only-no Red or Pink in Color   Nothing to eat or drink after midnight.  Bring a list of medications.   Advised that they must bring a  as that IV sedation is being used.           The patient voiced an understanding and was agreeable.

## 2024-07-05 ENCOUNTER — HOSPITAL ENCOUNTER (OUTPATIENT)
Facility: HOSPITAL | Age: 49
Setting detail: HOSPITAL OUTPATIENT SURGERY
Discharge: HOME | End: 2024-07-05
Attending: SURGERY | Admitting: SURGERY
Payer: COMMERCIAL

## 2024-07-05 ENCOUNTER — ANESTHESIA EVENT (OUTPATIENT)
Dept: GASTROENTEROLOGY | Facility: HOSPITAL | Age: 49
End: 2024-07-05
Payer: COMMERCIAL

## 2024-07-05 ENCOUNTER — ANESTHESIA (OUTPATIENT)
Dept: GASTROENTEROLOGY | Facility: HOSPITAL | Age: 49
End: 2024-07-05
Payer: COMMERCIAL

## 2024-07-05 VITALS
RESPIRATION RATE: 16 BRPM | SYSTOLIC BLOOD PRESSURE: 126 MMHG | WEIGHT: 250 LBS | TEMPERATURE: 96.6 F | BODY MASS INDEX: 41.65 KG/M2 | DIASTOLIC BLOOD PRESSURE: 74 MMHG | HEIGHT: 65 IN | HEART RATE: 79 BPM | OXYGEN SATURATION: 95 %

## 2024-07-05 DIAGNOSIS — Z98.84 STATUS POST LAPAROSCOPIC SLEEVE GASTRECTOMY: ICD-10-CM

## 2024-07-05 DIAGNOSIS — R12 HEARTBURN: ICD-10-CM

## 2024-07-05 PROBLEM — K44.9 HIATAL HERNIA: Status: ACTIVE | Noted: 2024-07-05

## 2024-07-05 PROCEDURE — 25810000003 SODIUM CHLORIDE 0.9 % SOLUTION: Performed by: SURGERY

## 2024-07-05 PROCEDURE — 87081 CULTURE SCREEN ONLY: CPT | Performed by: SURGERY

## 2024-07-05 PROCEDURE — 25010000002 PROPOFOL 10 MG/ML EMULSION: Performed by: NURSE ANESTHETIST, CERTIFIED REGISTERED

## 2024-07-05 RX ORDER — LIDOCAINE HYDROCHLORIDE 20 MG/ML
INJECTION, SOLUTION EPIDURAL; INFILTRATION; INTRACAUDAL; PERINEURAL AS NEEDED
Status: DISCONTINUED | OUTPATIENT
Start: 2024-07-05 | End: 2024-07-05 | Stop reason: SURG

## 2024-07-05 RX ORDER — SODIUM CHLORIDE 0.9 % (FLUSH) 0.9 %
10 SYRINGE (ML) INJECTION AS NEEDED
Status: DISCONTINUED | OUTPATIENT
Start: 2024-07-05 | End: 2024-07-05 | Stop reason: HOSPADM

## 2024-07-05 RX ORDER — SODIUM CHLORIDE 9 MG/ML
500 INJECTION, SOLUTION INTRAVENOUS CONTINUOUS PRN
Status: DISCONTINUED | OUTPATIENT
Start: 2024-07-05 | End: 2024-07-05 | Stop reason: HOSPADM

## 2024-07-05 RX ORDER — PROPOFOL 10 MG/ML
VIAL (ML) INTRAVENOUS AS NEEDED
Status: DISCONTINUED | OUTPATIENT
Start: 2024-07-05 | End: 2024-07-05 | Stop reason: SURG

## 2024-07-05 RX ORDER — PANTOPRAZOLE SODIUM 40 MG/1
40 TABLET, DELAYED RELEASE ORAL DAILY
Qty: 30 TABLET | Refills: 1 | Status: SHIPPED | OUTPATIENT
Start: 2024-07-05

## 2024-07-05 RX ADMIN — PROPOFOL 50 MG: 10 INJECTION, EMULSION INTRAVENOUS at 08:09

## 2024-07-05 RX ADMIN — PROPOFOL 100 MG: 10 INJECTION, EMULSION INTRAVENOUS at 08:08

## 2024-07-05 RX ADMIN — PROPOFOL 50 MG: 10 INJECTION, EMULSION INTRAVENOUS at 08:10

## 2024-07-05 RX ADMIN — LIDOCAINE HYDROCHLORIDE 100 MG: 20 INJECTION, SOLUTION EPIDURAL; INFILTRATION; INTRACAUDAL; PERINEURAL at 08:08

## 2024-07-05 RX ADMIN — SODIUM CHLORIDE 500 ML: 9 INJECTION, SOLUTION INTRAVENOUS at 07:27

## 2024-07-05 NOTE — ANESTHESIA PREPROCEDURE EVALUATION
Anesthesia Evaluation     no history of anesthetic complications:   NPO Solid Status: > 8 hours  NPO Liquid Status: > 8 hours           Airway   Mallampati: III  TM distance: >3 FB  Possible difficult intubation  Dental      Pulmonary    (+) ,sleep apnea  (-) not a smoker  Cardiovascular   Exercise tolerance: good (4-7 METS)    (+) hypertension, hyperlipidemia  (-) CAD      Neuro/Psych  (-) seizures, TIA  GI/Hepatic/Renal/Endo    (+) morbid obesity, diabetes mellitus (borderline) type 2  (-) liver disease, no renal disease    Musculoskeletal     Abdominal    Substance History      OB/GYN          Other                    Anesthesia Plan    ASA 3     MAC     intravenous induction     Anesthetic plan, risks, benefits, and alternatives have been provided, discussed and informed consent has been obtained with: patient.    CODE STATUS:

## 2024-07-05 NOTE — H&P (VIEW-ONLY)
"  Patient Care Team:  Fredo Meraz APRN as PCP - General (Nurse Practitioner)  Emiliana Murphy MA (Inactive) as Medical Assistant    Subjective     Kristine Veloz is a 49 y.o. female.  She was welcome back into our program.    Kristine is post op approximately 4-1/2 years from her sleeve gastrectomy.  She is currently on her regular diet.  She states that she is not measuring her meals and has not done so for several years specifically 2 years.  She stated during those 2 years she had 1 year of significant ailments secondary to COVID and the flu.  The following year she was recovering from those ailments per her statement.  She was unable to continue her routine of exercise and following the dietary prescription recommendations.  She is here to reestablish treatment of her morbid obesity and to rejoin our program.    Review Of Systems:  General ROS: positive for  - weight gain  Psychological ROS: positive for - anxiety and depression  Respiratory ROS: no cough, shortness of breath, or wheezing  Cardiovascular ROS: no chest pain or dyspnea on exertion  Gastrointestinal ROS: no abdominal pain, change in bowel habits, or black or bloody stools  positive for - heartburn    The following portions of the patient's history were reviewed and updated as appropriate: allergies, current medications, past family history, past medical history, past social history, past surgical history, and problem list.    Objective   /88 (Patient Position: Sitting)   Pulse 78   Temp 96.6 °F (35.9 °C) (Temporal)   Resp 18   Ht 165.1 cm (65\")   Wt 113 kg (250 lb)   LMP 10/04/2019   SpO2 98%   BMI 41.60 kg/m²       07/05/24  0706   Weight: 113 kg (250 lb)        General Appearance:  awake, alert, oriented, in no acute distress  Lungs:  Normal expansion.  Clear to auscultation.  No rales, rhonchi, or wheezing.  Heart:  Heart regular rate and rhythm  Abdomen:  Soft, non-tender, normal bowel sounds; no bruits, " organomegaly or masses.  Abnormal shape: obese    ASSESSMENT/ PLAN    Encounter Diagnoses   Name Primary?    Status post laparoscopic sleeve gastrectomy Yes    Class 3 severe obesity due to excess calories with serious comorbidity and body mass index (BMI) of 40.0 to 44.9 in adult     Heartburn          Due to her duration and symptoms of heartburn I have recommended she obtain an esophagogastroduodenoscopy with biopsies to assess for any contraindications for surgical weight loss.  I explained the alternatives  include not doing anything, or pursuing an UGI series which only offers a diagnosis with potential less accuracy compared to EGD, the benefits of the EGD such as identifying the pathology and anatomy of the upper GI system, and the risks and complications of the endoscopy were discussed in detail as well. I discussed the risk of perforation (one out of 8384-7880, riskier with dilation), bleeding (one out of 500), and the rare risks of infection, adverse reaction to anesthesia, respiratory failure, cardiac failure including MI and adverse reaction to medications such as allergic reactions. We discussed consequences that could occur if a risk were to develop such as the need for hospitalization, blood transfusion, surgical intervention, medications, pain, disability and death. The patient verbalizes understanding and agrees to proceed. such as bleeding, perforation, swallowing difficulties and gas bloat can occur after this procedure.    Upon completion of our discussion and addressing and answering her questions to her satisfation, informed consent was obtained.  She will be scheduled accordingly for the esophagogastroduodenoscopy procedure.  With respect to her morbid obesity and history I recommended that she continue with our program services which include our educational classes and courses as well as being assessed with our dietitian and nurse practitioner services.  I also expressed my concern of her  anxiety and depression which appears to be long-term and I recommended that she establish behavioral health services locally if available and if not I will also place a consultation for her to visit with one of our known specialists.  She was in agreement.  I discussed the patients findings and my recommendations with patient.     07/05/24  08:04 CDT  Patient Care Team:  Fredo Meraz APRN as PCP - General (Nurse Practitioner)  Emiliana Murphy MA (Inactive) as Medical Assistant  Luc Hendrickson MD FACS

## 2024-07-05 NOTE — PROGRESS NOTES
"  Patient Care Team:  Fredo Meraz APRN as PCP - General (Nurse Practitioner)  Emiliana Murphy MA (Inactive) as Medical Assistant    Subjective     Kristine Veloz is a 49 y.o. female.  She was welcome back into our program.    Kristine is post op approximately 4-1/2 years from her sleeve gastrectomy.  She is currently on her regular diet.  She states that she is not measuring her meals and has not done so for several years specifically 2 years.  She stated during those 2 years she had 1 year of significant ailments secondary to COVID and the flu.  The following year she was recovering from those ailments per her statement.  She was unable to continue her routine of exercise and following the dietary prescription recommendations.  She is here to reestablish treatment of her morbid obesity and to rejoin our program.    Review Of Systems:  General ROS: positive for  - weight gain  Psychological ROS: positive for - anxiety and depression  Respiratory ROS: no cough, shortness of breath, or wheezing  Cardiovascular ROS: no chest pain or dyspnea on exertion  Gastrointestinal ROS: no abdominal pain, change in bowel habits, or black or bloody stools  positive for - heartburn    The following portions of the patient's history were reviewed and updated as appropriate: allergies, current medications, past family history, past medical history, past social history, past surgical history, and problem list.    Objective   /88 (Patient Position: Sitting)   Pulse 78   Temp 96.6 °F (35.9 °C) (Temporal)   Resp 18   Ht 165.1 cm (65\")   Wt 113 kg (250 lb)   LMP 10/04/2019   SpO2 98%   BMI 41.60 kg/m²       07/05/24  0706   Weight: 113 kg (250 lb)        General Appearance:  awake, alert, oriented, in no acute distress  Lungs:  Normal expansion.  Clear to auscultation.  No rales, rhonchi, or wheezing.  Heart:  Heart regular rate and rhythm  Abdomen:  Soft, non-tender, normal bowel sounds; no bruits, " organomegaly or masses.  Abnormal shape: obese    ASSESSMENT/ PLAN    Encounter Diagnoses   Name Primary?    Status post laparoscopic sleeve gastrectomy Yes    Class 3 severe obesity due to excess calories with serious comorbidity and body mass index (BMI) of 40.0 to 44.9 in adult     Heartburn          Due to her duration and symptoms of heartburn I have recommended she obtain an esophagogastroduodenoscopy with biopsies to assess for any contraindications for surgical weight loss.  I explained the alternatives  include not doing anything, or pursuing an UGI series which only offers a diagnosis with potential less accuracy compared to EGD, the benefits of the EGD such as identifying the pathology and anatomy of the upper GI system, and the risks and complications of the endoscopy were discussed in detail as well. I discussed the risk of perforation (one out of 7898-9585, riskier with dilation), bleeding (one out of 500), and the rare risks of infection, adverse reaction to anesthesia, respiratory failure, cardiac failure including MI and adverse reaction to medications such as allergic reactions. We discussed consequences that could occur if a risk were to develop such as the need for hospitalization, blood transfusion, surgical intervention, medications, pain, disability and death. The patient verbalizes understanding and agrees to proceed. such as bleeding, perforation, swallowing difficulties and gas bloat can occur after this procedure.    Upon completion of our discussion and addressing and answering her questions to her satisfation, informed consent was obtained.  She will be scheduled accordingly for the esophagogastroduodenoscopy procedure.  With respect to her morbid obesity and history I recommended that she continue with our program services which include our educational classes and courses as well as being assessed with our dietitian and nurse practitioner services.  I also expressed my concern of her  anxiety and depression which appears to be long-term and I recommended that she establish behavioral health services locally if available and if not I will also place a consultation for her to visit with one of our known specialists.  She was in agreement.  I discussed the patients findings and my recommendations with patient.     07/05/24  08:04 CDT  Patient Care Team:  Fredo Meraz APRN as PCP - General (Nurse Practitioner)  Emiliana Murphy MA (Inactive) as Medical Assistant  Luc Hendrickson MD FACS

## 2024-07-05 NOTE — ANESTHESIA POSTPROCEDURE EVALUATION
Patient: Kristine Veloz    Procedure Summary       Date: 07/05/24 Room / Location: [facility] ENDOSCOPY 5 / [facility] ENDOSCOPY    Anesthesia Start: 0804 Anesthesia Stop: 0815    Procedure: ESOPHAGOGASTRODUODENOSCOPY WITH ANESTHESIA Diagnosis:       Status post laparoscopic sleeve gastrectomy      Heartburn      (Status post laparoscopic sleeve gastrectomy [Z98.84])      (Heartburn [R12])    Surgeons: Luc Hendrickson MD Provider: Winston Yanez CRNA    Anesthesia Type: MAC ASA Status: 3            Anesthesia Type: MAC    Vitals  Vitals Value Taken Time   /74 07/05/24 0831   Temp     Pulse 74 07/05/24 0831   Resp 16 07/05/24 0830   SpO2 96 % 07/05/24 0831   Vitals shown include unfiled device data.        Post Anesthesia Care and Evaluation    Patient location during evaluation: PHASE II  Patient participation: complete - patient participated  Level of consciousness: awake and alert  Pain score: 0  Pain management: adequate    Airway patency: patent  Anesthetic complications: No anesthetic complications  PONV Status: none  Cardiovascular status: acceptable  Respiratory status: acceptable  Hydration status: acceptable  No anesthesia care post op

## 2024-07-06 LAB — UREASE TISS QL: NEGATIVE

## 2024-07-15 ENCOUNTER — TELEPHONE (OUTPATIENT)
Dept: BARIATRICS/WEIGHT MGMT | Facility: CLINIC | Age: 49
End: 2024-07-15
Payer: COMMERCIAL

## 2024-07-15 NOTE — TELEPHONE ENCOUNTER
"Patient questions with respect to her upper endoscopy have been answered.  I explained to the patient that her antiacid medication was prescribed due to findings of her stomach.  She is to complete them as instructed.  The patient did have a question with respect to her hiatal hernia  and being told it could be repaired during surgery.  I explained to the patient that it was my error and apologized with respect to thinking she had not had a weight loss surgical procedure when indeed she did.  Her stomach was not incredibly smaller with respect to her upper endoscopy.  I also explained that she should continue to measure her foods and do the \"food challenge\" by measuring her foods and cup portions and specific types of foods such as chicken and/or steak to use as measured foods.  She is going to start our program again for continue management of her morbid obesity.  "

## 2024-08-13 ENCOUNTER — TELEPHONE (OUTPATIENT)
Dept: SURGERY | Facility: CLINIC | Age: 49
End: 2024-08-13
Payer: COMMERCIAL

## 2024-08-13 NOTE — TELEPHONE ENCOUNTER
Called PT to remind them of their upcoming appt tomorrow. Informed them of their appt D/T. Reminder to bring new patient paperwork, insurance cards, and photo ID. PT voiced understanding and confirmed appt.    CIARA  08/13

## 2024-08-15 ENCOUNTER — NUTRITION (OUTPATIENT)
Dept: BARIATRICS/WEIGHT MGMT | Facility: CLINIC | Age: 49
End: 2024-08-15
Payer: COMMERCIAL

## 2024-08-15 VITALS — BODY MASS INDEX: 41.55 KG/M2 | WEIGHT: 249.4 LBS | HEIGHT: 65 IN

## 2024-08-15 DIAGNOSIS — E66.01 CLASS 3 SEVERE OBESITY DUE TO EXCESS CALORIES WITH SERIOUS COMORBIDITY AND BODY MASS INDEX (BMI) OF 40.0 TO 44.9 IN ADULT: Primary | ICD-10-CM

## 2024-08-15 DIAGNOSIS — R73.03 PREDIABETES: ICD-10-CM

## 2024-08-15 DIAGNOSIS — Z98.84 STATUS POST LAPAROSCOPIC SLEEVE GASTRECTOMY: ICD-10-CM

## 2024-08-15 PROCEDURE — 97802 MEDICAL NUTRITION INDIV IN: CPT

## 2024-08-15 NOTE — PROGRESS NOTES
"Metabolic and Bariatric Surgery Adult Nutrition Assessment    Patient Name: Kristine Veloz   YOB: 1975   MRN: [id number]     Assessment Date:  08/15/2024     Reason for Visit: Initial Nutrition Assessment     Treatment Pathway: Postoperative Gastric Sleeve Gastrectomy 4+ yrs    Assessment    Anthropometrics   Wt Readings from Last 1 Encounters:   08/15/24 113 kg (249 lb 6.4 oz)     Ht Readings from Last 1 Encounters:   08/15/24 165.1 cm (65\")     BMI Readings from Last 1 Encounters:   08/15/24 41.50 kg/m²        Initial Weight/Date: 271 lbs (April 2019)  Presurgical Weight: 250.6 lbs (10/4/2019)  Surgery Date: Oct 14, 2019    Program restart Date/Weight: 254 lbs (May 2024)  Weight Changes since last visit: -4.6 lbs  Net Weight Change: -21.6 lbs    Past Medical History:   Diagnosis Date    Abnormal thyroid function test 06/01/2018    Anxiety     Back pain     Depression     Diabetes mellitus *Pt states she's always been told prediabetes.      Hyperlipidemia     Hypertension     Secondary hyperparathyroidism, non-renal 06/01/2018    Sleep apnea     patient reports post gastric sleeve she does not wear her C-PAP machine. apnea better per patient and spouse.    Vitamin D deficiency 06/01/2018      Past Surgical History:   Procedure Laterality Date    CHOLECYSTECTOMY  08/2008    Lap     ENDOSCOPY N/A 9/3/2019    Procedure: ESOPHAGOGASTRODUODENOSCOPY WITH ANESTHESIA;  Surgeon: Luc Hendrickson MD;  Location: [facility] ENDOSCOPY;  Service: General    ENDOSCOPY N/A 7/5/2024    Procedure: ESOPHAGOGASTRODUODENOSCOPY WITH ANESTHESIA;  Surgeon: Luc Hendrickson MD;  Location: [facility] ENDOSCOPY;  Service: General;  Laterality: N/A;  pre op status post gasstric sleeve  post hiatal hernia  pcp Fredo Ceja    GASTRIC SLEEVE LAPAROSCOPIC N/A 10/14/2019    Procedure: GASTRIC SLEEVE LAPAROSCOPIC;  Surgeon: Luc Hendrickson MD;  Location: [facility] OR;  Service: Bariatric    HYSTERECTOMY  2020      Current " Outpatient Medications   Medication Sig Dispense Refill    loratadine (ALLERGY) 10 MG tablet Take 1 tablet by mouth Daily.      losartan (COZAAR) 100 MG tablet Take 1 tablet by mouth Daily.      Multiple Vitamins-Minerals (MULTIVITAMIN ADULT) chewable tablet Chew 1 tablet Daily.      pantoprazole (PROTONIX) 40 MG EC tablet Take 1 tablet by mouth Daily. 30 tablet 1    venlafaxine (EFFEXOR) 75 MG tablet Take 1 tablet by mouth Daily.      VITAMIN D PO Take  by mouth.      atorvastatin (LIPITOR) 10 MG tablet Take 1 tablet by mouth Daily. (Patient not taking: Reported on 8/15/2024)      FEROSUL 325 (65 Fe) MG tablet Take 1 tablet by mouth Daily. (Patient not taking: Reported on 8/15/2024)       No current facility-administered medications for this visit.      Allergies   Allergen Reactions    Penicillins Shortness Of Breath     Other reaction(s): Hives        Pertinent Social/Behavior/Environmental History: lives with spouse and son. 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    Nutrition Recall  Eating 2-3 meals daily   (M1) not usually doesn't eat in the am despite recognizing it is important. Usually skips a morning meal. Today had B/E/C biscuit.   (M2) ~11 am/12 pm usually chicken something (grilled chicken, grilled chicken) or fish or shrimp. OR [employer] Cheese steak wrap.   (M3) chicken rafael. Made at home.   (M4) n/a  Snacking - sometimes, not daily. Few days/wk - lately it has been watermelon/bananas/trail mix. Or cottage cheese.   Monitoring portions- not currently. Does estimate small portions.   Calculating Protein- not calculating.   Drinking sugary/carbonated beverages- Mt Dew (down to 1, 16oz bottle/day)   Fluid Intake- maybe two bottles of water daily. Does drink Gatorade zero and propel  Supplement Intake- not currently.   MVI/B12/Calcium Citrate/Vitamin D- orally.       Nutrition Intervention  Nutrition education for morbid obesity hx sleeve gastrectomy, and Pt  reported prediabetes (last A1C 5.8) . Nutrition coaching and intensive behavioral therapy for behavior change provided.  Strategies used included Comprehensive education, Motivational Interviewing , Problem Solving, Skill Development for meal planning, and Provided sample menus  Reviewed nutritional needs for Postoperative Gastric Sleeve Gastrectomy 4+ yrs . Including but not limited to daily bariatric or multi-vitamin daily, B12, Calcium Citrate with Vitamin D, Eat 4 meals per day at 1 c portions, , Half of all meals should be servings of vegetables., Protein goal: 65 gms., 1 protein shake daily (discussed guidelines of compliant shakes), Eliminate snacks., Reduce fat, sugar, and/or salt in food choices., Choose more nutrient dense foods., Choose foods with increased fiber., Monitor portion sizes using measuring cups., Eliminate soda and sugar-sweetened beverages, and Increase fluid intake to 64 ounces per day   Self-monitoring strategies such as keeping a food journal (on paper or electronically) and calculating fluid/protein intake were discussed.  Recommend increasing physical activity, beyond normal daily habits, gradually working to reach ~30 minutes daily.      Goals  1. Have 4 meals/day + a protein shake.   2. Measure consistently.   3. Half of meals should be vegetables.   4. Keep a food journal.     Monitoring/Evaluation Plan  Anticipate follow up in 1 months. Continue collaboration of care with physician and treatment team.     Time Spent 45 minutes    Electronically signed by  Dory Patel RDN, LD  08/15/2024 13:31 CDT.

## 2025-04-18 ENCOUNTER — TELEMEDICINE (OUTPATIENT)
Dept: BARIATRICS/WEIGHT MGMT | Facility: CLINIC | Age: 50
End: 2025-04-18
Payer: COMMERCIAL

## 2025-04-18 DIAGNOSIS — I10 PRIMARY HYPERTENSION: ICD-10-CM

## 2025-04-18 DIAGNOSIS — Z98.84 STATUS POST LAPAROSCOPIC SLEEVE GASTRECTOMY: ICD-10-CM

## 2025-04-18 DIAGNOSIS — K21.9 GASTROESOPHAGEAL REFLUX DISEASE, UNSPECIFIED WHETHER ESOPHAGITIS PRESENT: Primary | ICD-10-CM

## 2025-04-18 NOTE — PROGRESS NOTES
Patient Care Team:  Fredo Meraz APRN as PCP - General (Nurse Practitioner)  Emiliana Murphy MA (Inactive) as Medical Assistant    Reason for Visit:  Surgical Weight loss    You have chosen to receive care through a telehealth visit.  Do you consent to use a video/audio connection for your medical care today? Yes    Type of Visit: Video Visit  Location of Patient: Work    Location of Provider: [facility]     History of Present Illness      Kristine Veloz is POD almost 5 years from a sleeve gastrectomy. She contacted the clinic via [employer] due to multiple concerns.  The patient has been struggling with weight regain and reflux.  Patient had a EGD with Dr. Hendrickson in July 2024 and thinks that her reflux has significantly worsened since then.  She states that she does not measure her foods and has not began protein shakes.  Patient admits to eating around off and on all day meals per day and admits to grazing in between meals. She admits to drinking at least 32 ounces of fluid each day and is unsure how many grams of protein she is intaking.  She states that she has gone with soda intake. Patient has gained pound since her last appointment with us.     She states she often wakes up around 2 AM and does not fall back to sleep until about 4 AM.  She thinks that she gets under 6 hours of sleep each night.  She admits to snoring.  She was positive for mild sleep apnea in 2018 and does not wear her CPAP.  She thinks that her sleep apnea is likely worsened.      Objective     Vital Signs  [facility] 10/04/2019          Physical Exam:  Physical Exam  Vitals reviewed.   Constitutional:       Appearance: She is obese.   Cardiovascular:      Rate and Rhythm: Normal rate and regular rhythm.   Pulmonary:      Effort: Pulmonary effort is normal.   Musculoskeletal:         General: Normal range of motion.   Skin:     General: Skin is warm and dry.   Neurological:      Mental Status: She is alert and  oriented to person, place, and time.   Psychiatric:         Mood and Affect: Mood normal.         Behavior: Behavior normal.          Physical Exam       Results Review:    Labs reviewed    Results        Medication Reviewed:   Current Outpatient Medications   Medication Sig Dispense Refill    atorvastatin (LIPITOR) 10 MG tablet Take 1 tablet by mouth Daily. (Patient not taking: Reported on 8/15/2024)      FEROSUL 325 (65 Fe) MG tablet Take 1 tablet by mouth Daily. (Patient not taking: Reported on 8/15/2024)      loratadine (ALLERGY) 10 MG tablet Take 1 tablet by mouth Daily.      losartan (COZAAR) 100 MG tablet Take 1 tablet by mouth Daily.      Multiple Vitamins-Minerals (MULTIVITAMIN ADULT) chewable tablet Chew 1 tablet Daily.      pantoprazole (PROTONIX) 40 MG EC tablet Take 1 tablet by mouth Daily. 30 tablet 1    venlafaxine (EFFEXOR) 75 MG tablet Take 1 tablet by mouth Daily.      VITAMIN D PO Take  by mouth.       No current facility-administered medications for this visit.       Assessment & Plan      ICD-10-CM ICD-9-CM   1. Gastroesophageal reflux disease, unspecified whether esophagitis present  K21.9 530.81   2. Status post laparoscopic sleeve gastrectomy  Z98.84 V45.86   3. Primary hypertension  I10 401.9      POD greater than 4 years from Sleeve Gastrectomy.  There are no diagnoses linked to this encounter.       Kristine Veloz and I discussed the importance of changing behavior for consistent and successful weight loss. We first reviewed again the definition of a meal which is defined as portion sizes less than a half a cup and those portions incorporating a protein. Specifically the protein should fill half of that volume. I also explained that she should attempt to consume 4 meals as defined above daily and to avoid snacking or grazing. She should also be mindful of adequate hydration by consuming at least 64 oz of water daily and incorporation of a regular exercise regimen.     I discussed  the importance of taking her multivitamins as directed.  She is to return to our office 1 month or as needed.    Goals for this month are:   Set eating times each day- advised to eat 4 meals and a protein shake   Will order EGD for further evaluation since reflux has significantly worsened since 7/2024   I am ordering a repeat sleep study due to the worsening of symptoms from her previous 1 in 2019.  We will perform an at home sleep study.  Will consider Zepboun for sleep apnea treatment if results show moderate or severe sleep apnea.  I had a long discussion with patient and encouraged her to measure all portions.  We discussed not exceeding 1 cup total and adding a protein supplement within each day.    GRACIELA Wilkins  04/18/25  09:44 CDT    There were issues with the video connection at times   Patient or patient representative verbalized consent for the use of Ambient Listening during the visit with  GRACIELA Wilkins for chart documentation. 4/18/2025  09:55 CDT

## (undated) DEVICE — SYR LL TP 10ML STRL

## (undated) DEVICE — THE CHANNEL CLEANING BRUSH IS A NYLON FLEXI BRUSH ATTACHED TO A FLEXIBLE PLASTIC SHEATH DESIGNED TO SAFELY REMOVE DEBRIS FROM FLEXIBLE ENDOSCOPES.

## (undated) DEVICE — SUT MNCRYL 4/0 RB1 27IN UD MCP214H

## (undated) DEVICE — HARMONIC ACE +7 LAPAROSCOPIC SHEARS ADVANCED HEMOSTASIS 5MM DIAMETER 45CM SHAFT LENGTH  FOR USE WITH GRAY HAND PIECE ONLY: Brand: HARMONIC ACE

## (undated) DEVICE — MONOPOLAR METZENBAUM SCISSOR, MINI BLADE TIP, DISPOSABLE: Brand: MONOPOLAR METZENBAUM SCISSOR, MINI BLADE TIP, DISPOSABLE

## (undated) DEVICE — TOWEL,OR,DSP,ST,BLUE,DLX,10/PK,8PK/CS: Brand: MEDLINE

## (undated) DEVICE — Device: Brand: DEFENDO AIR/WATER/SUCTION AND BIOPSY VALVE

## (undated) DEVICE — VISIGI 3D®  CALIBRATION SYSTEM  SIZE 40FR STD W/ BULB: Brand: BOEHRINGER® VISIGI 3D™ SLEEVE GASTRECTOMY CALIBRATION SYSTEM, SIZE 40FR W/BULB

## (undated) DEVICE — 2, DISPOSABLE SUCTION/IRRIGATOR WITHOUT DISPOSABLE TIP: Brand: STRYKEFLOW

## (undated) DEVICE — GLV SURG BIOGEL LTX PF 8

## (undated) DEVICE — ENDOPATH XCEL WITH OPTIVIEW TECHNOLOGY UNIVERSAL TROCAR STABILITY SLEEVES: Brand: ENDOPATH XCEL OPTIVIEW

## (undated) DEVICE — GLV SURG TRIUMPH GREEN W/ALOE PF LTX 8.5 STRL

## (undated) DEVICE — SENSR O2 OXIMAX FNGR A/ 18IN NONSTR

## (undated) DEVICE — SKIN AFFIX SURG ADHESIVE 72/CS 0.55ML: Brand: MEDLINE

## (undated) DEVICE — SUT VIC 0 SUTUPAK TIES 18IN J906G

## (undated) DEVICE — ENDOPATH XCEL WITH OPTIVIEW TECHNOLOGY BLADELESS TROCARS WITH STABILITY SLEEVES: Brand: ENDOPATH XCEL OPTIVIEW

## (undated) DEVICE — BANDAGE,GAUZE,BULKEE II,4.5"X4.1YD,STRL: Brand: MEDLINE

## (undated) DEVICE — ENDOPATH XCEL BLADELESS TROCARS WITH STABILITY SLEEVES: Brand: ENDOPATH XCEL

## (undated) DEVICE — CONMED SCOPE SAVER BITE BLOCK, 20X27 MM: Brand: SCOPE SAVER

## (undated) DEVICE — GLV SURG TRIUMPH ORTHO W/ALOE PF LTX 7.0

## (undated) DEVICE — APPL CHLORAPREP W/TINT 26ML ORNG

## (undated) DEVICE — CUFF,BP,DISP,1 TUBE,ADULT,HP: Brand: MEDLINE

## (undated) DEVICE — SUT VIC 3/0 SH 36IN VCP527H

## (undated) DEVICE — FRCP BX RADJAW4 NDL 2.8 240 STD OG

## (undated) DEVICE — SYS CLOSE PORTII CARTR/THOMASN XL

## (undated) DEVICE — GOWN,NON-REINFORCED,SIRUS,SET IN SLV,XXL: Brand: MEDLINE

## (undated) DEVICE — FLTR PLUMEPORT LAP W/CONN STRL

## (undated) DEVICE — ENDOGATOR AUXILIARY WATER JET CONNECTOR: Brand: ENDOGATOR

## (undated) DEVICE — PAD LAP CHOLE: Brand: MEDLINE INDUSTRIES, INC.

## (undated) DEVICE — BNDR ABD 4PANEL 12IN 74TO85IN

## (undated) DEVICE — PK TURNOVER RM ADV

## (undated) DEVICE — NDL HYPO PRECISIONGLIDE REG 22G 1 1/2

## (undated) DEVICE — APL DUPLOSPRAYER MIS 40CM

## (undated) DEVICE — ECHELON FLEX POWERED PLUS LONG ARTICULATING ENDOSCOPIC LINEAR CUTTER, 60MM: Brand: ECHELON FLEX

## (undated) DEVICE — VISUALIZATION SYSTEM: Brand: CLEARIFY

## (undated) DEVICE — TBG SMPL FLTR LINE NASL 02/C02 A/ BX/100